# Patient Record
Sex: FEMALE | Race: ASIAN | NOT HISPANIC OR LATINO | Employment: OTHER | URBAN - METROPOLITAN AREA
[De-identification: names, ages, dates, MRNs, and addresses within clinical notes are randomized per-mention and may not be internally consistent; named-entity substitution may affect disease eponyms.]

---

## 2021-06-04 ENCOUNTER — TRANSFERRED RECORDS (OUTPATIENT)
Dept: MULTI SPECIALTY CLINIC | Facility: CLINIC | Age: 61
End: 2021-06-04

## 2021-06-04 LAB — PAP SMEAR - HIM PATIENT REPORTED: NEGATIVE

## 2021-09-01 ENCOUNTER — OFFICE VISIT (OUTPATIENT)
Dept: ENDOCRINOLOGY | Facility: CLINIC | Age: 61
End: 2021-09-01
Payer: COMMERCIAL

## 2021-09-01 ENCOUNTER — LAB (OUTPATIENT)
Dept: LAB | Facility: CLINIC | Age: 61
End: 2021-09-01

## 2021-09-01 VITALS
DIASTOLIC BLOOD PRESSURE: 89 MMHG | OXYGEN SATURATION: 98 % | WEIGHT: 117 LBS | HEART RATE: 84 BPM | HEIGHT: 60 IN | SYSTOLIC BLOOD PRESSURE: 135 MMHG | BODY MASS INDEX: 22.97 KG/M2

## 2021-09-01 DIAGNOSIS — E11.9 TYPE 2 DIABETES MELLITUS WITHOUT COMPLICATION, WITHOUT LONG-TERM CURRENT USE OF INSULIN (H): ICD-10-CM

## 2021-09-01 DIAGNOSIS — E11.9 TYPE 2 DIABETES MELLITUS WITHOUT COMPLICATION, WITHOUT LONG-TERM CURRENT USE OF INSULIN (H): Primary | ICD-10-CM

## 2021-09-01 LAB — HBA1C MFR BLD: 7.3 % (ref 0–5.6)

## 2021-09-01 PROCEDURE — 80048 BASIC METABOLIC PNL TOTAL CA: CPT

## 2021-09-01 PROCEDURE — 83036 HEMOGLOBIN GLYCOSYLATED A1C: CPT

## 2021-09-01 PROCEDURE — 99204 OFFICE O/P NEW MOD 45 MIN: CPT | Performed by: INTERNAL MEDICINE

## 2021-09-01 PROCEDURE — 82043 UR ALBUMIN QUANTITATIVE: CPT

## 2021-09-01 PROCEDURE — 36415 COLL VENOUS BLD VENIPUNCTURE: CPT

## 2021-09-01 PROCEDURE — 84460 ALANINE AMINO (ALT) (SGPT): CPT

## 2021-09-01 ASSESSMENT — MIFFLIN-ST. JEOR: SCORE: 1007.21

## 2021-09-01 NOTE — PROGRESS NOTES
"Name: Darcy Story is a 61 year old woman, referred by Dr. Zavala (woman) for evaluation of    Chief Complaint   Patient presents with     Diabetes       HPI:  Recent issues:  Here for evaluation of diabetes.  She recalls recommendation to see an endocrinologist by her allergist  Reviewed medical history from patient and Epic chart record        2017. Diagnosis of diabetes mellitus  Medical evaluations with Cedric TOM/Doc  Initial treatment with metformin mediation    Previous [facility] labs include:      Current DM medications:  Metformin 500 mg 1-tab morning and evening  .  Blood glucose (BG) meter One Touch   Tests BID   Recent -248 mg/dl   No meter data available today    FamHx DM: mother  Recent [facility] labs include:  Lab Results   Component Value Date    A1C 7.3 (H) 09/01/2021     12/27/2012    POTASSIUM 3.7 12/27/2012    CHLORIDE 102 12/27/2012    CO2 26 12/27/2012    ANIONGAP 12 12/27/2012    GLC 99 12/27/2012    BUN 13 12/27/2012    CR 0.62 12/27/2012    GFRESTIMATED >90 12/27/2012    GFRESTBLACK >90 12/27/2012    ARIEL 9.3 12/27/2012     Last eye exam 2019, but recent concerns about \"floaters\"  DM Complications: none      Lives in [place], [facility]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  Also sees Dr. Mague Zavala/Allergist, Dr. Misael Murphy/rheumatology     PMH/PSH:  Past Medical History:   Diagnosis Date     Achilles tendon pain     previous right achilles surgery     Cervical pain (neck)     epidural     HTN (hypertension)      Osteoarthritis      Osteopenia      Type 2 diabetes mellitus without complication (H)      Past Surgical History:   Procedure Laterality Date     EXCISE CYST BAKER'S       LAPAROTOMY, TUBAL LIGATION, COMBINED       REMOVE IMPLANT BREAST         Family Hx:  No family history on file.      Social Hx:  Social History     Socioeconomic History     Marital status:      Spouse name: " Not on file     Number of children: Not on file     Years of education: Not on file     Highest education level: Not on file   Occupational History     Not on file   Tobacco Use     Smoking status: Never Smoker     Smokeless tobacco: Never Used   Substance and Sexual Activity     Alcohol use: Never     Drug use: Never     Sexual activity: Not on file   Other Topics Concern     Not on file   Social History Narrative     Not on file     Social Determinants of Health     Financial Resource Strain:      Difficulty of Paying Living Expenses:    Food Insecurity:      Worried About Running Out of Food in the Last Year:      Ran Out of Food in the Last Year:    Transportation Needs:      Lack of Transportation (Medical):      Lack of Transportation (Non-Medical):    Physical Activity:      Days of Exercise per Week:      Minutes of Exercise per Session:    Stress:      Feeling of Stress :    Social Connections:      Frequency of Communication with Friends and Family:      Frequency of Social Gatherings with Friends and Family:      Attends Sabianist Services:      Active Member of Clubs or Organizations:      Attends Club or Organization Meetings:      Marital Status:    Intimate Partner Violence:      Fear of Current or Ex-Partner:      Emotionally Abused:      Physically Abused:      Sexually Abused:           MEDICATIONS:  has a current medication list which includes the following prescription(s): cetirizine and metformin.    ROS:     ROS: 10 point ROS neg other than the symptoms noted above in the HPI.    GENERAL: mild fatigue, wt stable; denies fevers, chills, night sweats.   HEENT: no dysphagia, odonophagia, diplopia, neck pain  THYROID:  no apparent hyper or hypothyroid symptoms  CV: no chest pain, pressure, palpitations  LUNGS: no SOB, MILLER, cough, wheezing   ABDOMEN: no diarrhea, constipation, abdominal pain  EXTREMITIES: no rashes, ulcers, edema  NEUROLOGY: no headaches, denies changes in vision, tingling,  extremitiy numbness   MSK: knees and shoulder pains; no muscle aches or pains, weakness  SKIN: no rashes or lesions  :  No menses since age 53  PSYCH:  stable mood, no significant anxiety or depression  ENDOCRINE: no heat or cold intolerance    Physical Exam   VS: /89   Pulse 84   Ht 1.524 m (5')   Wt 53.1 kg (117 lb)   SpO2 98%   BMI 22.85 kg/m    GENERAL: AXOX3, NAD, short stature, well dressed, answering questions appropriately, appears stated age.  THYROID:  normal gland, no apparent nodules or goiter  HEENT: neck non-tender, no exopthalmous, no proptosis, EOMI  CV: RRR, no rubs, gallops, no murmurs  LUNGS: CTAB, no wheezes, rales, or ronchi  ABDOMEN: soft, nontender, nondistended  EXTREMITIES: no edema, +pedal pulses, no lesions  NEUROLOGY: CN grossly intact, no tremors  MSK: grossly intact  SKIN: right great toenail thickening; no rashes, no lesions    LABS:    All pertinent notes, labs, and images personally reviewed by me.     A/P:  Encounter Diagnosis   Name Primary?     Type 2 diabetes mellitus without complication, without long-term current use of insulin (H) Yes       Comments:  Reviewed health history and diabetes issues.  Current T2DM management plan with PCP reasonable, though I advise repeat lab testing    Plan:  Discussed general issues with the diabetes diagnosis and management  We discussed the hgbA1c test which reflects previous overall glucose levels or control  Discussed the importance of blood glucose (BG) testing to assess glucose trends  Provided general overview of the diabetes medication options and medication treatment plan.    Recommend:  Continue current metformin med use  Will discuss metformin dose options, other med options such as DPP4-I, GLP1RA, SGLT2-I meds  Goal target FBG  mg/dl  Check FBG or premeal BID glucose levels daily  Need to clarify her issues with fingertip BG testing, meter and lancet device use  Consider use of Felecia CGM sensor, if covered by  insurance   Felecia pamphlet given  Keep focus on diet, exercise, weight management.  Due for f/u eye exam soon  Advise having fasting lipid panel testing and dilated eye examination, at least annually  Future T2DM management with PCP reasonable    Addressed patient questions today      There are no Patient Instructions on file for this visit.    Future labs ordered today:   Orders Placed This Encounter   Procedures     Basic metabolic panel     Hemoglobin A1c     Albumin Random Urine Quantitative with Creat Ratio     ALT     Radiology/Consults ordered today: None    Total time spent in with the patient evaluation:  25 min  Additional time spent reviewing pertinent lab tests and chart notes, and documentation:  10 min    Follow-up:  9/15/21 at 12p, Return    THERESA España MD, MS  Endocrinology  [facility]    CC:  QUYNH Zavala, and JAIMIE Jones

## 2021-09-01 NOTE — LETTER
"    9/1/2021         RE: Darcy Story  [address] [facility] [address]        Dear Colleague,    Thank you for referring your patient, Darcy Story, to the [facility] [place]. Please see a copy of my visit note below.    Name: Darcy Story is a 61 year old woman, referred by Dr. Zavala (woman) for evaluation of    Chief Complaint   Patient presents with     Diabetes       HPI:  Recent issues:  Here for evaluation of diabetes.  She recalls recommendation to see an endocrinologist by her allergist  Reviewed medical history from patient and Epic chart record        2017. Diagnosis of diabetes mellitus  Medical evaluations with Cedric TOM/Doc  Initial treatment with metformin mediation    Previous [facility] labs include:      Current DM medications:  Metformin 500 mg 1-tab morning and evening  .  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  Also sees Dr. Mague Zavala/Allergist, Dr. Misael Murphy/rheumatology     PMH/PSH:  Past Medical History:   Diagnosis Date     Achilles tendon pain     previous right achilles surgery     Cervical pain (neck)     epidural     HTN (hypertension)      Osteoarthritis      Osteopenia      Type 2 diabetes mellitus without complication (H)      Past Surgical History: "   Procedure Laterality Date     EXCISE CYST BAKER'S       LAPAROTOMY, TUBAL LIGATION, COMBINED       REMOVE IMPLANT BREAST         Family Hx:  No family history on file.      Social Hx:  Social History     Socioeconomic History     Marital status:      Spouse name: Not on file     Number of children: Not on file     Years of education: Not on file     Highest education level: Not on file   Occupational History     Not on file   Tobacco Use     Smoking status: Never Smoker     Smokeless tobacco: Never Used   Substance and Sexual Activity     Alcohol use: Never     Drug use: Never     Sexual activity: Not on file   Other Topics Concern     Not on file   Social History Narrative     Not on file     Social Determinants of Health     Financial Resource Strain:      Difficulty of Paying Living Expenses:    Food Insecurity:      Worried About Running Out of Food in the Last Year:      Ran Out of Food in the Last Year:    Transportation Needs:      Lack of Transportation (Medical):      Lack of Transportation (Non-Medical):    Physical Activity:      Days of Exercise per Week:      Minutes of Exercise per Session:    Stress:      Feeling of Stress :    Social Connections:      Frequency of Communication with Friends and Family:      Frequency of Social Gatherings with Friends and Family:      Attends Adventism Services:      Active Member of Clubs or Organizations:      Attends Club or Organization Meetings:      Marital Status:    Intimate Partner Violence:      Fear of Current or Ex-Partner:      Emotionally Abused:      Physically Abused:      Sexually Abused:           MEDICATIONS:  has a current medication list which includes the following prescription(s): cetirizine and metformin.    ROS:     ROS: 10 point ROS neg other than the symptoms noted above in the HPI.    GENERAL: mild fatigue, wt stable; denies fevers, chills, night sweats.   HEENT: no dysphagia, odonophagia, diplopia, neck pain  THYROID:  no apparent  hyper or hypothyroid symptoms  CV: no chest pain, pressure, palpitations  LUNGS: no SOB, MILLER, cough, wheezing   ABDOMEN: no diarrhea, constipation, abdominal pain  EXTREMITIES: no rashes, ulcers, edema  NEUROLOGY: no headaches, denies changes in vision, tingling, extremitiy numbness   MSK: knees and shoulder pains; no muscle aches or pains, weakness  SKIN: no rashes or lesions  :  No menses since age 53  PSYCH:  stable mood, no significant anxiety or depression  ENDOCRINE: no heat or cold intolerance    Physical Exam   VS: /89   Pulse 84   Ht 1.524 m (5')   Wt 53.1 kg (117 lb)   SpO2 98%   BMI 22.85 kg/m    GENERAL: AXOX3, NAD, short stature, well dressed, answering questions appropriately, appears stated age.  THYROID:  normal gland, no apparent nodules or goiter  HEENT: neck non-tender, no exopthalmous, no proptosis, EOMI  CV: RRR, no rubs, gallops, no murmurs  LUNGS: CTAB, no wheezes, rales, or ronchi  ABDOMEN: soft, nontender, nondistended  EXTREMITIES: no edema, +pedal pulses, no lesions  NEUROLOGY: CN grossly intact, no tremors  MSK: grossly intact  SKIN: right great toenail thickening; no rashes, no lesions    LABS:    All pertinent notes, labs, and images personally reviewed by me.     A/P:  Encounter Diagnosis   Name Primary?     Type 2 diabetes mellitus without complication, without long-term current use of insulin (H) Yes       Comments:  Reviewed health history and diabetes issues.  Current T2DM management plan with PCP reasonable, though I advise repeat lab testing    Plan:  Discussed general issues with the diabetes diagnosis and management  We discussed the hgbA1c test which reflects previous overall glucose levels or control  Discussed the importance of blood glucose (BG) testing to assess glucose trends  Provided general overview of the diabetes medication options and medication treatment plan.    Recommend:  Continue current metformin med use  Will discuss metformin dose options, other  med options such as DPP4-I, GLP1RA, SGLT2-I meds  Goal target FBG  mg/dl  Check FBG or premeal BID glucose levels daily  Need to clarify her issues with fingertip BG testing, meter and lancet device use  Consider use of Felecia CGM sensor, if covered by insurance   Felecia pamphlet given  Keep focus on diet, exercise, weight management.  Due for f/u eye exam soon  Advise having fasting lipid panel testing and dilated eye examination, at least annually  Future T2DM management with PCP reasonable    Addressed patient questions today      There are no Patient Instructions on file for this visit.    Future labs ordered today:   Orders Placed This Encounter   Procedures     Basic metabolic panel     Hemoglobin A1c     Albumin Random Urine Quantitative with Creat Ratio     ALT     Radiology/Consults ordered today: None    Total time spent in with the patient evaluation:  25 min  Additional time spent reviewing pertinent lab tests and chart notes, and documentation:  10 min    Follow-up:  9/15/21 at 12p, Return    THERESA España MD, MS  Endocrinology  [facility]    CC:  QUYNH Zavala, and JAIMIE Jones           Again, thank you for allowing me to participate in the care of your patient.        Sincerely,        Constantino España MD

## 2021-09-02 LAB
ALT SERPL W P-5'-P-CCNC: 45 U/L (ref 0–50)
ANION GAP SERPL CALCULATED.3IONS-SCNC: 3 MMOL/L (ref 3–14)
BUN SERPL-MCNC: 13 MG/DL (ref 7–30)
CALCIUM SERPL-MCNC: 9.2 MG/DL (ref 8.5–10.1)
CHLORIDE BLD-SCNC: 106 MMOL/L (ref 94–109)
CO2 SERPL-SCNC: 28 MMOL/L (ref 20–32)
CREAT SERPL-MCNC: 0.52 MG/DL (ref 0.52–1.04)
CREAT UR-MCNC: 97 MG/DL
GFR SERPL CREATININE-BSD FRML MDRD: >90 ML/MIN/1.73M2
GLUCOSE BLD-MCNC: 148 MG/DL (ref 70–99)
MICROALBUMIN UR-MCNC: 13 MG/L
MICROALBUMIN/CREAT UR: 13.4 MG/G CR (ref 0–25)
POTASSIUM BLD-SCNC: 4.6 MMOL/L (ref 3.4–5.3)
SODIUM SERPL-SCNC: 137 MMOL/L (ref 133–144)

## 2021-09-15 ENCOUNTER — OFFICE VISIT (OUTPATIENT)
Dept: ENDOCRINOLOGY | Facility: CLINIC | Age: 61
End: 2021-09-15
Payer: COMMERCIAL

## 2021-09-15 VITALS
SYSTOLIC BLOOD PRESSURE: 136 MMHG | DIASTOLIC BLOOD PRESSURE: 88 MMHG | HEART RATE: 85 BPM | BODY MASS INDEX: 22.97 KG/M2 | WEIGHT: 117.6 LBS

## 2021-09-15 DIAGNOSIS — E11.9 TYPE 2 DIABETES MELLITUS WITHOUT COMPLICATION, WITHOUT LONG-TERM CURRENT USE OF INSULIN (H): Primary | ICD-10-CM

## 2021-09-15 PROCEDURE — 99214 OFFICE O/P EST MOD 30 MIN: CPT | Performed by: INTERNAL MEDICINE

## 2021-09-15 RX ORDER — SITAGLIPTIN AND METFORMIN HYDROCHLORIDE 1000; 100 MG/1; MG/1
1 TABLET, FILM COATED, EXTENDED RELEASE ORAL DAILY
Qty: 30 TABLET | Refills: 11 | Status: SHIPPED | OUTPATIENT
Start: 2021-09-15 | End: 2022-06-08 | Stop reason: DRUGHIGH

## 2021-09-15 NOTE — LETTER
"    9/15/2021         RE: Darcy Story  [address] [facility] [address]        Dear Colleague,    Thank you for referring your patient, Darcy Story, to the [facility] [place]. Please see a copy of my visit note below.      Recent issues:   Diabetes follow-up evaluation.  Reviewed medical history from patient, recent preappt labs, and Epic chart record        2017. Diagnosis of diabetes mellitus  Medical evaluations with Cedric TOM/Doc  Initial treatment with metformin mediation    Previous Doc labs include:        9/1/21. Initial diabetes evaluation with me at [place]  Reviewed health history and diabetes issues    Current DM medications:  Metformin 500 mg 1-tab morning and evening  .  Blood glucose (BG) meter One Touch   Tests BID   Previous -248 mg/dl   No meter data available today    FamHx DM: mother  Recent [facility] labs include:  Lab Results   Component Value Date    A1C 7.3 (H) 09/01/2021     09/01/2021    POTASSIUM 4.6 09/01/2021    CHLORIDE 106 09/01/2021    CO2 28 09/01/2021    ANIONGAP 3 09/01/2021     (H) 09/01/2021    BUN 13 09/01/2021    CR 0.52 09/01/2021    GFRESTIMATED >90 09/01/2021    GFRESTBLACK >90 12/27/2012    ARIEL 9.2 09/01/2021    UCRR 97 09/01/2021    MICROL 13 09/01/2021    UMALCR 13.40 09/01/2021     Last eye exam 2019, but recent concerns about \"floaters\"  DM Complications: none      Lives in [place], [facility]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  Also sees Dr. Mague Zavala/Allergist, Dr. Misael Murphy/rheumatology     PMH/PSH:  Past Medical History:   Diagnosis Date     Achilles tendon pain     previous right achilles surgery     Cervical pain (neck)     epidural     Chronic back pain      HTN (hypertension)      Near syncope      Osteoarthritis      Osteopenia      Type 2 diabetes mellitus without complication (H)      Past Surgical History:   Procedure " Laterality Date     EXCISE CYST BAKER'S       LAPAROTOMY, TUBAL LIGATION, COMBINED       REMOVE IMPLANT BREAST         Family Hx:  No family history on file.      Social Hx:  Social History     Socioeconomic History     Marital status:      Spouse name: Not on file     Number of children: Not on file     Years of education: Not on file     Highest education level: Not on file   Occupational History     Not on file   Tobacco Use     Smoking status: Never Smoker     Smokeless tobacco: Never Used   Substance and Sexual Activity     Alcohol use: Never     Drug use: Never     Sexual activity: Not on file   Other Topics Concern     Not on file   Social History Narrative     Not on file     Social Determinants of Health     Financial Resource Strain:      Difficulty of Paying Living Expenses:    Food Insecurity:      Worried About Running Out of Food in the Last Year:      Ran Out of Food in the Last Year:    Transportation Needs:      Lack of Transportation (Medical):      Lack of Transportation (Non-Medical):    Physical Activity:      Days of Exercise per Week:      Minutes of Exercise per Session:    Stress:      Feeling of Stress :    Social Connections:      Frequency of Communication with Friends and Family:      Frequency of Social Gatherings with Friends and Family:      Attends Catholic Services:      Active Member of Clubs or Organizations:      Attends Club or Organization Meetings:      Marital Status:    Intimate Partner Violence:      Fear of Current or Ex-Partner:      Emotionally Abused:      Physically Abused:      Sexually Abused:           MEDICATIONS:  has a current medication list which includes the following prescription(s): cetirizine and janumet xr.    ROS:     ROS: 10 point ROS neg other than the symptoms noted above in the HPI.    GENERAL: mild fatigue, wt stable; denies fevers, chills, night sweats.   HEENT: no dysphagia, odonophagia, diplopia, neck pain  THYROID:  no apparent hyper or  hypothyroid symptoms  CV: no chest pain, pressure, palpitations  LUNGS: no SOB, MILLER, cough, wheezing   ABDOMEN: no diarrhea, constipation, abdominal pain  EXTREMITIES: no rashes, ulcers, edema  NEUROLOGY: no headaches, denies changes in vision, tingling, extremitiy numbness   MSK: knees and shoulder pains; no muscle aches or pains, weakness  SKIN: no rashes or lesions  :  No menses since age 53  PSYCH:  stable mood, no significant anxiety or depression  ENDOCRINE: no heat or cold intolerance    Physical Exam   VS: /88   Pulse 85   Wt 53.3 kg (117 lb 9.6 oz)   BMI 22.97 kg/m    GENERAL: AXOX3, NAD, short stature, well dressed, answering questions appropriately, appears stated age.  THYROID:  normal gland, no apparent nodules or goiter  HEENT: neck non-tender, no exopthalmous, no proptosis, EOMI  CV: RRR, no rubs, gallops, no murmurs  LUNGS: CTAB, no wheezes, rales, or ronchi  ABDOMEN: soft, nontender, nondistended  EXTREMITIES: no pedal edema  NEUROLOGY: CN grossly intact, no tremors  MSK: grossly intact  SKIN: right great toenail thickening; no rashes, no lesions    LABS:    All pertinent notes, labs, and images personally reviewed by me.     A/P:  Encounter Diagnosis   Name Primary?     Type 2 diabetes mellitus without complication, without long-term current use of insulin (H) Yes       Comments:  Reviewed health history and diabetes issues.  Her previous FBG variable, suspect postmeal hyperglycemia  Reviewed and interpreted tests that I previously ordered.   Ordered appropriate tests for the endocrinology disease management.    Management options discussed and implemented after shared medical decision making with the patient.  T2DM problem is chronic with exacerbation progression, hyperglycemia    Plan:  Discussed general issues with the diabetes diagnosis and management  We discussed the hgbA1c test which reflects previous overall glucose levels or control  Discussed the importance of blood glucose  (BG) testing to assess glucose trends  Provided general overview of the diabetes medication options and medication treatment plan.    Recommend:  Change to more aggressive T2DM med plan, adding a DPP4-I medication, discussed Janumet vs Jentadueto options  Plan med change to JanumetXR 100/1000 as 1-tab daily   Reviewed this medication and dosing   New Rx sent to pharmacy  Goal target FBG  mg/dl  Check FBG or premeal BID glucose levels daily  Need to clarify her issues with fingertip BG testing, meter and lancet device use  Consider use of Felecia CGM sensor, if covered by insurance  Advised seeing one of our [facility] CDE's, wear diagnostic Felecia rather than purchase of ([id number]) CGM sensor   Diab Ed Referral placed  Keep focus on diet, exercise, weight management.  Due for f/u eye exam soon  Advise having fasting lipid panel testing and dilated eye examination, at least annually    Addressed patient questions today    There are no Patient Instructions on file for this visit.    Future labs ordered today:   Orders Placed This Encounter   Procedures     AMB Adult Diabetes Educator Referral     Radiology/Consults ordered today: AMBULATORY ADULT DIABETES EDUCATOR REFERRAL    Total time spent in with the patient evaluation:  15 min  Additional time spent reviewing pertinent lab tests and chart notes, and documentation:  5 min    Follow-up:  2/2022    THERESA España MD, MS  Endocrinology  [facility]    CC:  QUYNH Zavala, and JAIMIE Jones           Again, thank you for allowing me to participate in the care of your patient.        Sincerely,        Constantino España MD

## 2021-09-15 NOTE — PROGRESS NOTES
"  Recent issues:   Diabetes follow-up evaluation.  Reviewed medical history from patient, recent preappt labs, and Epic chart record        2017. Diagnosis of diabetes mellitus  Medical evaluations with Cedric TOM/Doc  Initial treatment with metformin mediation    Previous Doc labs include:        9/1/21. Initial diabetes evaluation with me at [place]  Reviewed health history and diabetes issues    Current DM medications:  Metformin 500 mg 1-tab morning and evening  .  Blood glucose (BG) meter One Touch   Tests BID   Previous -248 mg/dl   No meter data available today    FamHx DM: mother  Recent [facility] labs include:  Lab Results   Component Value Date    A1C 7.3 (H) 09/01/2021     09/01/2021    POTASSIUM 4.6 09/01/2021    CHLORIDE 106 09/01/2021    CO2 28 09/01/2021    ANIONGAP 3 09/01/2021     (H) 09/01/2021    BUN 13 09/01/2021    CR 0.52 09/01/2021    GFRESTIMATED >90 09/01/2021    GFRESTBLACK >90 12/27/2012    ARIEL 9.2 09/01/2021    UCRR 97 09/01/2021    MICROL 13 09/01/2021    UMALCR 13.40 09/01/2021     Last eye exam 2019, but recent concerns about \"floaters\"  DM Complications: none      Lives in [place], [facility]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  Also sees Dr. Mague Zavala/Allergist, Dr. Misael Murphy/rheumatology     PMH/PSH:  Past Medical History:   Diagnosis Date     Achilles tendon pain     previous right achilles surgery     Cervical pain (neck)     epidural     Chronic back pain      HTN (hypertension)      Near syncope      Osteoarthritis      Osteopenia      Type 2 diabetes mellitus without complication (H)      Past Surgical History:   Procedure Laterality Date     EXCISE CYST BAKER'S       LAPAROTOMY, TUBAL LIGATION, COMBINED       REMOVE IMPLANT BREAST         Family Hx:  No family history on file.      Social Hx:  Social History     Socioeconomic History     Marital status:      Spouse name: Not " on file     Number of children: Not on file     Years of education: Not on file     Highest education level: Not on file   Occupational History     Not on file   Tobacco Use     Smoking status: Never Smoker     Smokeless tobacco: Never Used   Substance and Sexual Activity     Alcohol use: Never     Drug use: Never     Sexual activity: Not on file   Other Topics Concern     Not on file   Social History Narrative     Not on file     Social Determinants of Health     Financial Resource Strain:      Difficulty of Paying Living Expenses:    Food Insecurity:      Worried About Running Out of Food in the Last Year:      Ran Out of Food in the Last Year:    Transportation Needs:      Lack of Transportation (Medical):      Lack of Transportation (Non-Medical):    Physical Activity:      Days of Exercise per Week:      Minutes of Exercise per Session:    Stress:      Feeling of Stress :    Social Connections:      Frequency of Communication with Friends and Family:      Frequency of Social Gatherings with Friends and Family:      Attends Denominational Services:      Active Member of Clubs or Organizations:      Attends Club or Organization Meetings:      Marital Status:    Intimate Partner Violence:      Fear of Current or Ex-Partner:      Emotionally Abused:      Physically Abused:      Sexually Abused:           MEDICATIONS:  has a current medication list which includes the following prescription(s): cetirizine and janumet xr.    ROS:     ROS: 10 point ROS neg other than the symptoms noted above in the HPI.    GENERAL: mild fatigue, wt stable; denies fevers, chills, night sweats.   HEENT: no dysphagia, odonophagia, diplopia, neck pain  THYROID:  no apparent hyper or hypothyroid symptoms  CV: no chest pain, pressure, palpitations  LUNGS: no SOB, MILLER, cough, wheezing   ABDOMEN: no diarrhea, constipation, abdominal pain  EXTREMITIES: no rashes, ulcers, edema  NEUROLOGY: no headaches, denies changes in vision, tingling, extremitiy  numbness   MSK: knees and shoulder pains; no muscle aches or pains, weakness  SKIN: no rashes or lesions  :  No menses since age 53  PSYCH:  stable mood, no significant anxiety or depression  ENDOCRINE: no heat or cold intolerance    Physical Exam   VS: /88   Pulse 85   Wt 53.3 kg (117 lb 9.6 oz)   BMI 22.97 kg/m    GENERAL: AXOX3, NAD, short stature, well dressed, answering questions appropriately, appears stated age.  THYROID:  normal gland, no apparent nodules or goiter  HEENT: neck non-tender, no exopthalmous, no proptosis, EOMI  CV: RRR, no rubs, gallops, no murmurs  LUNGS: CTAB, no wheezes, rales, or ronchi  ABDOMEN: soft, nontender, nondistended  EXTREMITIES: no pedal edema  NEUROLOGY: CN grossly intact, no tremors  MSK: grossly intact  SKIN: right great toenail thickening; no rashes, no lesions    LABS:    All pertinent notes, labs, and images personally reviewed by me.     A/P:  Encounter Diagnosis   Name Primary?     Type 2 diabetes mellitus without complication, without long-term current use of insulin (H) Yes       Comments:  Reviewed health history and diabetes issues.  Her previous FBG variable, suspect postmeal hyperglycemia  Reviewed and interpreted tests that I previously ordered.   Ordered appropriate tests for the endocrinology disease management.    Management options discussed and implemented after shared medical decision making with the patient.  T2DM problem is chronic with exacerbation progression, hyperglycemia    Plan:  Discussed general issues with the diabetes diagnosis and management  We discussed the hgbA1c test which reflects previous overall glucose levels or control  Discussed the importance of blood glucose (BG) testing to assess glucose trends  Provided general overview of the diabetes medication options and medication treatment plan.    Recommend:  Change to more aggressive T2DM med plan, adding a DPP4-I medication, discussed Janumet vs Jentadueto options  Plan med  change to JanumetXR 100/1000 as 1-tab daily   Reviewed this medication and dosing   New Rx sent to pharmacy  Goal target FBG  mg/dl  Check FBG or premeal BID glucose levels daily  Need to clarify her issues with fingertip BG testing, meter and lancet device use  Consider use of Felecia CGM sensor, if covered by insurance  Advised seeing one of our [facility] CDE's, wear diagnostic Felecia rather than purchase of (DexcomG6) CGM sensor   Diab Ed Referral placed  Keep focus on diet, exercise, weight management.  Due for f/u eye exam soon  Advise having fasting lipid panel testing and dilated eye examination, at least annually    Addressed patient questions today    There are no Patient Instructions on file for this visit.    Future labs ordered today:   Orders Placed This Encounter   Procedures     AMB Adult Diabetes Educator Referral     Radiology/Consults ordered today: AMBULATORY ADULT DIABETES EDUCATOR REFERRAL    Total time spent in with the patient evaluation:  15 min  Additional time spent reviewing pertinent lab tests and chart notes, and documentation:  5 min    Follow-up:  2/2022    THERESA España MD, MS  Endocrinology  [facility]    CC:  QUYNH Zavala, and JAIMIE Jones

## 2021-09-17 ENCOUNTER — ALLIED HEALTH/NURSE VISIT (OUTPATIENT)
Dept: EDUCATION SERVICES | Facility: CLINIC | Age: 61
End: 2021-09-17
Attending: INTERNAL MEDICINE
Payer: COMMERCIAL

## 2021-09-17 ENCOUNTER — TELEPHONE (OUTPATIENT)
Dept: EDUCATION SERVICES | Facility: CLINIC | Age: 61
End: 2021-09-17

## 2021-09-17 DIAGNOSIS — E11.9 TYPE 2 DIABETES MELLITUS WITHOUT COMPLICATION, WITHOUT LONG-TERM CURRENT USE OF INSULIN (H): ICD-10-CM

## 2021-09-17 PROCEDURE — 95250 CONT GLUC MNTR PHYS/QHP EQP: CPT | Performed by: DIETITIAN, REGISTERED

## 2021-09-17 RX ORDER — PROCHLORPERAZINE 25 MG/1
SUPPOSITORY RECTAL
Qty: 1 EACH | Refills: 3 | Status: CANCELLED | OUTPATIENT
Start: 2021-09-17

## 2021-09-17 RX ORDER — PROCHLORPERAZINE 25 MG/1
SUPPOSITORY RECTAL
Qty: 3 EACH | Refills: 11 | Status: CANCELLED | OUTPATIENT
Start: 2021-09-17

## 2021-09-17 NOTE — LETTER
9/17/2021         RE: Darcy Story  [address] [facility] [address]        Dear Colleague,    Thank you for referring your patient, Darcy Story, to the [facility] [place]. Please see a copy of my visit note below.    Diabetes Self-Management Education & Support  Professional Continuous Glucose Monitor Insertion    SUBJECTIVE/OBJECTIVE:     Darcy Story presents for professional Continuous Glucose Monitor Insertion.     Patient comments/concerns: My insurance will not cover a Felecia but they will cover a Dexcom or diagnostic Felecia study. I'm having issues with poking my finger - meter requiring multiple pokes/strips to get a test. Very frustrated with variability in numbers. Works hard to follow a healthy lifestyle - doesn't eat bread, exercises every morning, doesn't drink alcohol or pop. Has not tolerated metformin well in the past - frequent diarrhea. Has not started Janumet yet but plans to  today, start tomorrow.     Lab Results:  Lab Results   Component Value Date    A1C 7.3 09/01/2021      Lab Results   Component Value Date     09/01/2021    GLC 99 12/27/2012       Medication:  Diabetes Medication(s)     Antidiabetic Combinations       SitaGLIPtin-MetFORMIN HCl (JANUMET XR) 100-1000 MG TB24    Take 1 tablet by mouth daily          ASSESSMENT:    CGM study indicated for: Difficult to manage hypoglycemia and/or hyperglycemia, Unexplained fluctuations in glucose values     INTERVENTION:   Sensor started today.     Sensor Type: LibrePro  Lot #: [id number]  Serial #: [id number]  Expiration Date: 10/31/21  Diabetes management related comments/concerns: I hate poking my finger and I have a very hard time getting blood - have had to poke my finger & use 12 strips to get a test before.    Sensor was inserted with no resistance or bleeding at insertion site.      Pt will plan to wear the sensor through 9/30/21.    WRITTEN AND VERBAL INFORMATION GIVEN TO  SUPPORT UNDERSTANDING OF:  LibrePro CGM: Sensor insertion, intention of monitoring for 14 days. Keep records of BG, food intake, exercise, and medication dosing during wear.       Patient verbalizes understanding of how to remove sensor, if needed, and all instructions provided.     Educational and other materials:  Food/exercise/medication log sheets  Contact information    PLAN:  Pt was given instructions for tracking BG, medications, food intake and activity.  Patient to return all items associated with the professional Continuous Glucose Monitor System.  See Patient Instructions, AVS printed and provided to patient today.    Recommend use of Dexcom G6 CGMS to allow for more glucose monitoring without finger pokes. She states her insurance will cover this. Will reach out to referring provider in separate encounter for sign off on orders & if patient has items by next visit, can bring in to get help getting started.     Follow-up:    Follow up on 9/30/21.    Andreia Seay RD, LD, [facility]   Time spent in DSMT: 20 minutes   Time spent in CGM insertion: 5 minutes, in addition to time spent in DSMT  Encounter Type: Individual

## 2021-09-17 NOTE — PATIENT INSTRUCTIONS
1. Plan to wear the LibrePro sensor for 14 days. It is okay to shower, bathe, and swim (up to 3 feet deep for 30 minutes)    2. Continue with your usual diabetes care plan - check blood sugars and take medicines, as prescribed.    3. Keep a log of what you eat and drink, when you take your medications and how much you take, and exercise you do while you are wearing the sensor.    4. Do not cover the sensor with extra adhesive (the small hole in the center of the sensor must remain uncovered)    5. Use a little extra care, especially when getting dressed or exercising, to avoid accidentally loosening or removing the sensor.     6. Remove the sensor if you need to have an MRI or CT scan.     If the LibrePro sensor comes off early, place it in a plastic bag or envelope and call your diabetes educator or bring it with you to your follow-up visit.     Return the sensor to the [facility] on 9/30.    [facility] for the [facility]:  For Your Diabetes Education and Nutrition Appointments Call:  [phone]   For Diabetes Education or Nutrition Related Questions:   Phone: [phone]  Send [employer]Munch Message   If you need a medication refill please contact your pharmacy. Please allow 3 business days for your refills to be completed.

## 2021-09-17 NOTE — PROGRESS NOTES
Diabetes Self-Management Education & Support  Professional Continuous Glucose Monitor Insertion    SUBJECTIVE/OBJECTIVE:     Darcy Story presents for professional Continuous Glucose Monitor Insertion.     Patient comments/concerns: My insurance will not cover a Felecia but they will cover a Dexcom or diagnostic Felecia study. I'm having issues with poking my finger - meter requiring multiple pokes/strips to get a test. Very frustrated with variability in numbers. Works hard to follow a healthy lifestyle - doesn't eat bread, exercises every morning, doesn't drink alcohol or pop. Has not tolerated metformin well in the past - frequent diarrhea. Has not started Janumet yet but plans to  today, start tomorrow.     Lab Results:  Lab Results   Component Value Date    A1C 7.3 09/01/2021      Lab Results   Component Value Date     09/01/2021    GLC 99 12/27/2012       Medication:  Diabetes Medication(s)     Antidiabetic Combinations       SitaGLIPtin-MetFORMIN HCl (JANUMET XR) 100-1000 MG TB24    Take 1 tablet by mouth daily          ASSESSMENT:    CGM study indicated for: Difficult to manage hypoglycemia and/or hyperglycemia, Unexplained fluctuations in glucose values     INTERVENTION:   Sensor started today.     Sensor Type: LibrePro  Lot #: [id number]  Serial #: [id number]  Expiration Date: 10/31/21  Diabetes management related comments/concerns: I hate poking my finger and I have a very hard time getting blood - have had to poke my finger & use 12 strips to get a test before.    Sensor was inserted with no resistance or bleeding at insertion site.      Pt will plan to wear the sensor through 9/30/21.    WRITTEN AND VERBAL INFORMATION GIVEN TO SUPPORT UNDERSTANDING OF:  LibrePro CGM: Sensor insertion, intention of monitoring for 14 days. Keep records of BG, food intake, exercise, and medication dosing during wear.       Patient verbalizes understanding of how to remove sensor, if needed, and all  instructions provided.     Educational and other materials:  Food/exercise/medication log sheets  Contact information    PLAN:  Pt was given instructions for tracking BG, medications, food intake and activity.  Patient to return all items associated with the professional Continuous Glucose Monitor System.  See Patient Instructions, AVS printed and provided to patient today.    Recommend use of Dexcom G6 CGMS to allow for more glucose monitoring without finger pokes. She states her insurance will cover this. Will reach out to referring provider in separate encounter for sign off on orders & if patient has items by next visit, can bring in to get help getting started.     Follow-up:    Follow up on 9/30/21.    Andreia Seay RD, LD, [facility]   Time spent in DSMT: 20 minutes   Time spent in CGM insertion: 5 minutes, in addition to time spent in DSMT  Encounter Type: Individual

## 2021-09-17 NOTE — TELEPHONE ENCOUNTER
Dr. España,   Patient is interested in getting away from finger pokes and was informed that Dexcom G6 is well covered by her insurance. I've pended orders - please sign off if you agree and I can help her get started at her follow up visit, once she has supplies.   Thank you!  Andreia Seay, RD, LD, [facility]

## 2021-09-24 ENCOUNTER — TELEPHONE (OUTPATIENT)
Dept: EDUCATION SERVICES | Facility: CLINIC | Age: 61
End: 2021-09-24

## 2021-09-24 NOTE — TELEPHONE ENCOUNTER
I returned call from pt. She wanted to know why the dexcom G6 Rx wasn't at her pharmacy. I reviewed her chart and told her that DR. España didn't sign off on it and that is why the pharmacy doesn't have it. I said it looks like he wants to see the results of her Pro Felecia before moving forward. I reminded her of her appt with Andreia next week and said Andreia will follow-up with her then.    Tameka Ames RN, [facility]

## 2021-09-30 ENCOUNTER — ALLIED HEALTH/NURSE VISIT (OUTPATIENT)
Dept: EDUCATION SERVICES | Facility: CLINIC | Age: 61
End: 2021-09-30
Payer: COMMERCIAL

## 2021-09-30 DIAGNOSIS — E11.9 TYPE 2 DIABETES MELLITUS WITHOUT COMPLICATION, WITHOUT LONG-TERM CURRENT USE OF INSULIN (H): Primary | ICD-10-CM

## 2021-09-30 PROCEDURE — G0108 DIAB MANAGE TRN  PER INDIV: HCPCS

## 2021-09-30 NOTE — Clinical Note
Dr. España,   Please see CDE progress note for continuous glucose monitoring (CGM) reports, assessment and recommendations.     As a provider, you can bill for a non face-to-face interpretation of the sensor report. If you would like to bill for this service, please create a 'Documentation Only' encounter noting your interpretation and assessment of CGM reports, your recommended plan, and bill for this CGM interpretation using code [id number].     I will check in with you to get your thoughts on her blood sugar trends when I have a chance.   Thanks,   Andreia Seay, RD, LD, [facility]

## 2021-09-30 NOTE — PROGRESS NOTES
Diabetes Self-Management Education & Support    Presents for: CGM Review    SUBJECTIVE/OBJECTIVE:  Presents for: CGM Review  Accompanied by: Self  Diabetes education in the past 24mo: No  Focus of Visit: CGM  Type of CGM visit: Professional CGM  Diabetes type: Type 2  Disease course: Getting harder to manage  How confident are you filling out medical forms by yourself:: Not Assessed  Diabetes management related comments/concerns: Didn't even notice wearing the Felecia  Transportation concerns: No  Difficulty affording diabetes medication?: No  Difficulty affording diabetes testing supplies?: No  Other concerns:: English as a second language  Cultural Influences/Ethnic Background:      Diabetes Symptoms & Complications:     Complications assessed today?: No    Patient Problem List and Family Medical History reviewed for relevant medical history, current medical status, and diabetes risk factors.    Vitals:  There were no vitals taken for this visit.  Estimated body mass index is 22.97 kg/m  as calculated from the following:    Height as of 9/1/21: 1.524 m (5').    Weight as of 9/15/21: 53.3 kg (117 lb 9.6 oz).   Last 3 BP:   BP Readings from Last 3 Encounters:   09/15/21 136/88   09/01/21 135/89   12/27/12 140/82       History   Smoking Status     Never Smoker   Smokeless Tobacco     Never Used       Labs:  Lab Results   Component Value Date    A1C 7.3 09/01/2021     Lab Results   Component Value Date     09/01/2021    GLC 99 12/27/2012     No results found for: LDL  No results found for: HDL]  GFR Estimate   Date Value Ref Range Status   09/01/2021 >90 >60 mL/min/1.73m2 Final     Comment:     As of July 11, 2021, eGFR is calculated by the CKD-EPI creatinine equation, without race adjustment. eGFR can be influenced by muscle mass, exercise, and diet. The reported eGFR is an estimation only and is only applicable if the renal function is stable.   12/27/2012 >90 >60 mL/min/1.7m2 Final     GFR Estimate If  Black   Date Value Ref Range Status   12/27/2012 >90 >60 mL/min/1.7m2 Final     Lab Results   Component Value Date    CR 0.52 09/01/2021    CR 0.62 12/27/2012     No results found for: MICROALBUMIN    Healthy Eating:  Healthy Eating Assessed Today: Yes  Meal planning/habits: Low carb, Smaller portions, Avoiding sweets  How many times a week on average do you eat food made away from home (restaurant/take-out)?: 3  Meals include: Breakfast, Lunch, Dinner  Beverages: Tea, Coffee    Being Active:  Being Active Assessed Today: Yes  Exercise:: Yes  Days per week of moderate to strenuous exercise (like a brisk walk): 7  On average, minutes per day of exercise at this level: 30  How intense was your typical exercise? : Moderate (like brisk walking)  Exercise Minutes per Week: 210  Barrier to exercise: None    Monitoring:  Monitoring Assessed Today: Yes  Did patient bring glucose meter to appointment? : Yes  Times checking blood sugar at home (number): 2  Times checking blood sugar at home (per): Day  Blood glucose trend: Fluctuating    See Felecia data below     Taking Medications:  Diabetes Medication(s)     Antidiabetic Combinations       SitaGLIPtin-MetFORMIN HCl (JANUMET XR) 100-1000 MG TB24    Take 1 tablet by mouth daily          Taking Medication Assessed Today: Yes  Current Treatments: Oral Medication (taken by mouth)  Problems taking diabetes medications regularly?: No  Diabetes medication side effects?: Yes    Problem Solving:  Problem Solving Assessed Today: No    Reducing Risks:  Reducing Risks Assessed Today: No    Healthy Coping:  Healthy Coping Assessed Today: Yes  Emotional response to diabetes: Ready to learn, Concern for health and well-being  Informal Support system:: Spouse  Stage of change: ACTION (Actively working towards change)  Support resources: None  Patient Activation Measure Survey Score:  No flowsheet data found.    Diabetes knowledge and skills assessment:   Patient is knowledgeable in diabetes  management concepts related to: Healthy Eating, Being Active, Monitoring and Taking Medication    Patient needs further education on the following diabetes management concepts: Taking Medication, Problem Solving, Reducing Risks and Healthy Coping    Based on learning assessment above, most appropriate setting for further diabetes education would be: Group class or Individual setting.      INTERVENTIONS:    REPORTS:                  Education provided today on:  AADE Self-Care Behaviors:  Healthy Eating: consistency in amount, composition, and timing of food intake, portion control and being sure to include carbs at meals & snacks  Being Active: relationship to blood glucose, describe appropriate activity program and recommended having some food before working out in the morning  Monitoring: individual blood glucose targets and frequency of monitoring    Pt verbalized understanding of concepts discussed and recommendations provided today.       Education Materials Provided:  CEL-SCI Report     ASSESSMENT:  Patient comes in to review Felecia reports. Dr. España denied orders for Dexcom G6 to await results of LibrePro study. Spent time reviewing trends - patient had just started Janumet on 9/18, the 2nd day of study and over the 2 week period, it appears blood sugar trends are slowly improving with this med. Patient states she is tolerating it far better than metformin alone. She works out every morning around 5:20a and consistent spike in blood sugar is seen after her workouts each morning. Discussed having something small to eat before she works out. Other spikes appear to be related to food choices. She would still be interested in pursuing Dexcom, since it's well covered by her insurance but is agreeable to using glucometer if Dr. España denies this again. Will discuss and follow up with patient in the future.     Glucose Patterns & Trends:  Hyperglycemia, weekend- postmeal and weekday-  premeal/post-exercise      PLAN  Will review results with Dr. España, see if he think CGM is now appropriate. Will follow up with patient over the phone.     Andreia Seay RD, LD, [facility]   Time Spent: 30 minutes  Encounter Type: Individual    Any diabetes medication dose changes were made via the CDE Protocol and Collaborative Practice Agreement with the patient's referring provider. A copy of this encounter was shared with the provider.

## 2021-10-30 ENCOUNTER — HEALTH MAINTENANCE LETTER (OUTPATIENT)
Age: 61
End: 2021-10-30

## 2021-12-25 ENCOUNTER — HEALTH MAINTENANCE LETTER (OUTPATIENT)
Age: 61
End: 2021-12-25

## 2021-12-29 ENCOUNTER — TELEPHONE (OUTPATIENT)
Dept: ENDOCRINOLOGY | Facility: CLINIC | Age: 61
End: 2021-12-29
Payer: COMMERCIAL

## 2021-12-29 ENCOUNTER — TELEPHONE (OUTPATIENT)
Dept: EDUCATION SERVICES | Facility: CLINIC | Age: 61
End: 2021-12-29
Payer: COMMERCIAL

## 2021-12-29 DIAGNOSIS — E11.9 TYPE 2 DIABETES MELLITUS WITHOUT COMPLICATION, WITHOUT LONG-TERM CURRENT USE OF INSULIN (H): Primary | ICD-10-CM

## 2021-12-29 RX ORDER — PROCHLORPERAZINE 25 MG/1
SUPPOSITORY RECTAL
Qty: 1 EACH | Refills: 0 | OUTPATIENT
Start: 2021-12-29 | End: 2021-12-30

## 2021-12-29 RX ORDER — PROCHLORPERAZINE 25 MG/1
SUPPOSITORY RECTAL
Qty: 3 EACH | Refills: 11 | OUTPATIENT
Start: 2021-12-29 | End: 2021-12-30

## 2021-12-29 RX ORDER — PROCHLORPERAZINE 25 MG/1
SUPPOSITORY RECTAL
Qty: 1 EACH | Refills: 3 | OUTPATIENT
Start: 2021-12-29 | End: 2021-12-30

## 2021-12-29 NOTE — TELEPHONE ENCOUNTER
Prescription approved per [facility] Refill Protocol. Pt aware to set up an appt with CDE for education and pt says insurance will cover Dexcom .Kylie Garza RN

## 2021-12-29 NOTE — TELEPHONE ENCOUNTER
[facility] Call Center    Phone Message    May a detailed message be left on voicemail: yes     Reason for Call: Other: Pt is requesting call back from provider or his care team. Per pt she was been waiting to hear back about if he has approved her dexcom. Per pt she discussed using the dexcom CGM with Andreiadaniel Seay, but that provider has been on maternity leave (see 12/29/21 chart note from Esther Cantrell). Pt would like to get this approved before the end of the year, so please review and call pt back ASAP to discuss.      Action Taken: Message routed to:  Other: endocrine    Travel Screening: Not Applicable

## 2021-12-29 NOTE — TELEPHONE ENCOUNTER
[facility] Call Center    Phone Message    May a detailed message be left on voicemail: yes     Reason for Call: Order(s): Other:     Reason for requested: Per Patient is wanting to know if she is needing to have an A1C test done before appt with Dr. España. Please advise.     Patient would like to get a call back to know.     Date needed: n/a    Provider name: Buck      Action Taken: Message routed to:  [facility] Endo    Travel Screening: Not Applicable

## 2021-12-29 NOTE — TELEPHONE ENCOUNTER
Patient called triage line asking about the continuous glucose monitor that she discussed with Andreia Seay. Andreia is now on maternity leave, however per notes 9/30, Dr España had not approved a prescription for a Dexcom for this patient.    Called patient back and left message, encouraged her to have a conversation with Dr España about this prescription, as he would be the provider to sign off on it.     RIKA Guy [facility]

## 2021-12-30 ENCOUNTER — TELEPHONE (OUTPATIENT)
Dept: ENDOCRINOLOGY | Facility: CLINIC | Age: 61
End: 2021-12-30
Payer: COMMERCIAL

## 2021-12-30 DIAGNOSIS — E11.9 TYPE 2 DIABETES MELLITUS WITHOUT COMPLICATION, WITHOUT LONG-TERM CURRENT USE OF INSULIN (H): ICD-10-CM

## 2021-12-30 RX ORDER — PROCHLORPERAZINE 25 MG/1
SUPPOSITORY RECTAL
Qty: 3 EACH | Refills: 11 | Status: SHIPPED | OUTPATIENT
Start: 2021-12-30 | End: 2023-01-30

## 2021-12-30 RX ORDER — PROCHLORPERAZINE 25 MG/1
SUPPOSITORY RECTAL
Qty: 1 EACH | Refills: 0 | Status: SHIPPED | OUTPATIENT
Start: 2021-12-30 | End: 2023-10-25

## 2021-12-30 RX ORDER — PROCHLORPERAZINE 25 MG/1
SUPPOSITORY RECTAL
Qty: 1 EACH | Refills: 3 | Status: SHIPPED | OUTPATIENT
Start: 2021-12-30 | End: 2023-01-30

## 2021-12-30 NOTE — TELEPHONE ENCOUNTER
[facility] Call Center    Phone Message    May a detailed message be left on voicemail: yes     Reason for Call: Medication Question or concern regarding medication   Prescription Clarification  Name of Medication: Continuous Blood Gluc  (DEXCOM G6 ) LINDA  And   Continuous Blood Gluc Sensor (DEXCOM G6 SENSOR) MISC   And   Continuous Blood Gluc Transmit (DEXCOM G6 TRANSMITTER) MISC  Prescribing Provider: Taco   Pharmacy: [facility]/PHARMACY #[id number] [facility] [address] [place] [address]   What on the order needs clarification? Pt spoke with the nurse yesterday (see 12/29/21 chart note). Per pt she went to her pharmacy today, and they hadn t received the referral. Writer informed pt that per her chart it shows that the requests prescriptions were sent to her pharmacy yesterday. Writer also confirmed with Pt that they were sent to the correct pharmacy. Please resend these prescriptions since they were not received.            Action Taken: Message routed to:  Other: endocrine    Travel Screening: Not Applicable

## 2021-12-31 NOTE — TELEPHONE ENCOUNTER
Message noted.  I believe patient's last hgbA1c was done in 9/2021.  Yes, I recommend repeat lab testing for hgbA1c and lipid panel (fasting labs) in next few weeks, preappt.  I have placed these lab orders for testing at her nearest [facility] clinic.    Thanks for relaying message, plan.    THERESA España MD, MS  Endocrinology  [facility]

## 2022-01-03 NOTE — TELEPHONE ENCOUNTER
Spoke with patient and patient is aware of lab appointment with instructions to fast before lab appointment. Patient is also going to call and schedule a Diabetes Education Appointment to help set up Dexcom. Patient is going to make this appointment before her scheduled appointment with Dr. España on 2/15/2022.    Dorothy Rios MA

## 2022-01-31 ENCOUNTER — LAB (OUTPATIENT)
Dept: LAB | Facility: CLINIC | Age: 62
End: 2022-01-31
Payer: COMMERCIAL

## 2022-01-31 DIAGNOSIS — E11.9 TYPE 2 DIABETES MELLITUS WITHOUT COMPLICATION, WITHOUT LONG-TERM CURRENT USE OF INSULIN (H): ICD-10-CM

## 2022-01-31 LAB
CHOLEST SERPL-MCNC: 188 MG/DL
FASTING STATUS PATIENT QL REPORTED: YES
HBA1C MFR BLD: 6.7 % (ref 0–5.6)
HDLC SERPL-MCNC: 48 MG/DL
LDLC SERPL CALC-MCNC: 98 MG/DL
NONHDLC SERPL-MCNC: 140 MG/DL
TRIGL SERPL-MCNC: 209 MG/DL

## 2022-01-31 PROCEDURE — 80061 LIPID PANEL: CPT

## 2022-01-31 PROCEDURE — 36415 COLL VENOUS BLD VENIPUNCTURE: CPT

## 2022-01-31 PROCEDURE — 83036 HEMOGLOBIN GLYCOSYLATED A1C: CPT

## 2022-03-01 ENCOUNTER — ALLIED HEALTH/NURSE VISIT (OUTPATIENT)
Dept: EDUCATION SERVICES | Facility: CLINIC | Age: 62
End: 2022-03-01
Payer: COMMERCIAL

## 2022-03-01 DIAGNOSIS — E11.9 TYPE 2 DIABETES MELLITUS WITHOUT COMPLICATION, WITHOUT LONG-TERM CURRENT USE OF INSULIN (H): Primary | ICD-10-CM

## 2022-03-01 PROCEDURE — G0108 DIAB MANAGE TRN  PER INDIV: HCPCS | Performed by: DIETITIAN, REGISTERED

## 2022-03-01 NOTE — PROGRESS NOTES
Diabetes Self Management Training: Personal Continuous Glucose Monitor Start    Darcy Story presents today for initiation of personal continuous glucose monitoring with patient-owned device related to Type 2 diabetes.    She is accompanied by self    Patient's diabetes management related comments/concerns: Has the Dexcom G6 but needs help to start, had to reschedule visit as she had covid when last scheduled    Patient would like this visit to be focused around the following diabetes-related behaviors and goals: placing and starting G6    ASSESSMENT:    Current Diabetes Management per Patient:  Diabetes medications   yes:     Diabetes Medication(s)     Antidiabetic Combinations       SitaGLIPtin-MetFORMIN HCl (JANUMET XR) 100-1000 MG TB24    Take 1 tablet by mouth daily          *Abbreviated insulin dose documentation key: Insulin Trade Name (dcpmsvkvi-qazzk-xrxkhc-bedtime) - i.e. Humalog 5-5-5-0 (Humalog 5 units at breakfast, 5 units at lunch, and 5 units at dinner).    Patient glucose self monitoring as follows: not fully assessed, she shows me blisters on finger tips where she has been poking.   BG meter: not assessed  BG results: not available     BG values are: unable to assess  Patient's most recent   Lab Results   Component Value Date    A1C 6.7 01/31/2022    is meeting goal of <7.0    Patient experiencing hypoglycemia? no    Nutrition:  Not assessed today    Physical Activity:  Not assessed today    CGM Preparation:  Patient completed homework (online training and/or Getting started with CGM guide)? No    Labs:  Lab Results   Component Value Date    A1C 6.7 01/31/2022     Lab Results   Component Value Date     09/01/2021    GLC 99 12/27/2012       INTERVENTION:    Patient was able to demonstrate ability to insert and sensor and transmitter without difficulty- she had not charged the reader so voices intention to go home and charge it then start the device.   She would not like to use her  phone/the sajan as reader    Education provided on:  Dexcom CGM system - Dexcom sensor, transmitter, , Dexcom mobile applications, sensor glucose versus blood glucose, trends and graphs, alarms and alerts, Dexcom sensor insertion, calibrating, stopping sensor and changing sensor, Dexcom Clarity software - reviewed all, some parts briefly.   She says her  is tech savvy- provided [email or web address] and advised watch more videos there, contact them if need help, resources available.   Recommended schedule follow up, but she would like to see Endo first, then reschedule with Andreia/CDE    CGM initial settings:   Dexcom: discussed, but not set today as unable to start devicde    PLAN:  Change sensor, as directed.    Follow-up:    Follow-up with endocrinology recommended.  Encouraged to schedule for diabetes education   Chart routed to referring provider.    Lizeth Tanner RD, LD, [facility]    Time Spent: 30 minutes  Encounter Type: Individual    Any diabetes medication dose changes were made via the CDE Protocol and Collaborative Practice Agreement with the patient's endocrinology provider. A copy of this encounter was shared with the provider.

## 2022-03-01 NOTE — Clinical Note
Darcy is wearing her new Dexcom G6. She is scheduled to see you in June. I encouraged follow up with [facility] sooner for ongoing DB care and support.   Lizeth Tanner RD, LD, [facility]

## 2022-06-08 ENCOUNTER — OFFICE VISIT (OUTPATIENT)
Dept: ENDOCRINOLOGY | Facility: CLINIC | Age: 62
End: 2022-06-08
Payer: COMMERCIAL

## 2022-06-08 VITALS
DIASTOLIC BLOOD PRESSURE: 84 MMHG | HEIGHT: 60 IN | SYSTOLIC BLOOD PRESSURE: 151 MMHG | HEART RATE: 76 BPM | WEIGHT: 116.7 LBS | BODY MASS INDEX: 22.91 KG/M2

## 2022-06-08 DIAGNOSIS — E11.9 TYPE 2 DIABETES MELLITUS WITHOUT COMPLICATION, WITHOUT LONG-TERM CURRENT USE OF INSULIN (H): Primary | ICD-10-CM

## 2022-06-08 PROCEDURE — 95251 CONT GLUC MNTR ANALYSIS I&R: CPT | Performed by: INTERNAL MEDICINE

## 2022-06-08 PROCEDURE — 99214 OFFICE O/P EST MOD 30 MIN: CPT | Performed by: INTERNAL MEDICINE

## 2022-06-08 NOTE — PROGRESS NOTES
"  Recent issues:   Diabetes follow-up evaluation.  Now using the DexcomG6 CGM, likes it   Reports chronically high SG levels for many months  Reviewed medical history from patient, recent preappt labs, and Epic chart record        2017. Diagnosis of diabetes mellitus  Medical evaluations with Cedric TOM/Doc  Initial treatment with metformin mediation  Previous Doc labs include:        9/1/21. Initial diabetes evaluation with me at [place]  Reviewed health history and diabetes issues  Taking metformin 500 mg BID  Advised change to JanumetXR 100/1000 every day, then CDE eval and diagnostic Felecia    ~12/2021. Started use of DexcomG6 CGM, likes it  Current DM medications:  JanumetXR 100/1000  1-tab daily  .  Blood glucose (BG) meter One Touch   Tests BID   No meter data available today    Recent DexcomG6 data:              FamHx DM: mother  Recent [facility] labs include:  Lab Results   Component Value Date    A1C 6.7 (H) 01/31/2022     09/01/2021    POTASSIUM 4.6 09/01/2021    CHLORIDE 106 09/01/2021    CO2 28 09/01/2021    ANIONGAP 3 09/01/2021     (H) 09/01/2021    BUN 13 09/01/2021    CR 0.52 09/01/2021    GFRESTIMATED >90 09/01/2021    GFRESTBLACK >90 12/27/2012    ARIEL 9.2 09/01/2021    CHOL 188 01/31/2022    TRIG 209 (H) 01/31/2022    HDL 48 (L) 01/31/2022    LDL 98 01/31/2022    NHDL 140 (H) 01/31/2022    UCRR 97 09/01/2021    MICROL 13 09/01/2021    UMALCR 13.40 09/01/2021     Last eye exam 2019, but recent concerns about \"floaters\"  DM Complications: none      Lives in [place], 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 Harshal Parry/Doc for general medicine evaluations.  Also sees Dr. Mague Zavala/Allergist, Dr. Misael Murphy/rheumatology     PMH/PSH:  Past Medical History:   Diagnosis Date     Achilles tendon pain     previous right achilles surgery     Cervical pain (neck)     epidural     Chronic back pain      HTN (hypertension)      Hypertriglyceridemia      Near syncope      " Osteoarthritis      Osteopenia      Type 2 diabetes mellitus without complication (H)      Past Surgical History:   Procedure Laterality Date     EXCISE CYST BAKER'S       LAPAROTOMY, TUBAL LIGATION, COMBINED       REMOVE IMPLANT BREAST         Family Hx:  No family history on file.      Social Hx:  Social History     Socioeconomic History     Marital status:      Spouse name: Not on file     Number of children: Not on file     Years of education: Not on file     Highest education level: Not on file   Occupational History     Not on file   Tobacco Use     Smoking status: Never Smoker     Smokeless tobacco: Never Used   Substance and Sexual Activity     Alcohol use: Never     Drug use: Never     Sexual activity: Not on file   Other Topics Concern     Not on file   Social History Narrative     Not on file     Social Determinants of Health     Financial Resource Strain: Not on file   Food Insecurity: Not on file   Transportation Needs: Not on file   Physical Activity: Not on file   Stress: Not on file   Social Connections: Not on file   Intimate Partner Violence: Not on file   Housing Stability: Not on file          MEDICATIONS:  has a current medication list which includes the following prescription(s): cetirizine, dexcom g6 sensor, dexcom g6 transmitter, ibuprofen, sitagliptin-metformin, and dexcom g6 .    ROS:     ROS: 10 point ROS neg other than the symptoms noted above in the HPI.    GENERAL: mild fatigue, wt stable; denies fevers, chills, night sweats.   HEENT: no dysphagia, odonophagia, diplopia, neck pain  THYROID:  no apparent hyper or hypothyroid symptoms  CV: no chest pain, pressure, palpitations  LUNGS: no SOB, MILLER, cough, wheezing   ABDOMEN: no diarrhea, constipation, abdominal pain  EXTREMITIES: no rashes, ulcers, edema  NEUROLOGY: no headaches, denies changes in vision, tingling, extremitiy numbness   MSK: knees and shoulder pains; no muscle aches or pains, weakness  SKIN: no rashes or  lesions  :  no menses since age 53  PSYCH:  stable mood, no significant anxiety or depression  ENDOCRINE: no heat or cold intolerance    Physical Exam   VS: BP (!) 151/84   Pulse 76   Ht 1.524 m (5')   Wt 52.9 kg (116 lb 11.2 oz)   BMI 22.79 kg/m    GENERAL: AXOX3, NAD, short stature, well dressed, answering questions appropriately, appears stated age.  THYROID:  normal gland, no apparent nodules or goiter  HEENT: neck non-tender, no exopthalmous, no proptosis, EOMI  CV: RRR, no rubs, gallops, no murmurs  LUNGS: CTAB, no wheezes, rales, or ronchi  ABDOMEN: normal size, soft, nondistended  EXTREMITIES: no pedal edema  NEUROLOGY: CN grossly intact, no tremors  MSK: grossly intact  SKIN: darker skin complexion; no rashes, no lesions    LABS:    All pertinent notes, labs, and images personally reviewed by me.     A/P:  Encounter Diagnosis   Name Primary?     Type 2 diabetes mellitus without complication, without long-term current use of insulin (H) Yes       Comments:  Reviewed health history and diabetes issues.  Recent DexcomG6 SG trend high, probably chronic... I am surprised she didn't message or return for f/u advice  Reviewed and interpreted tests that I previously ordered.   Ordered appropriate tests for the endocrinology disease management.    Management options discussed and implemented after shared medical decision making with the patient.  T2DM problem is chronic with exacerbation progression, hyperglycemia    Plan:  Discussed general issues with the diabetes diagnosis and management  We discussed the hgbA1c test which reflects previous overall glucose levels or control  Discussed the importance of blood glucose (BG) testing to assess glucose trends  Provided general overview of the diabetes medication options and medication treatment plan  Reviewed recent DexcomG6 CGM glucose trend data, in detail.    Recommend:  Change to more aggressive T2DM med plan, reviewed several options  Plan med change to  JanumetXR 50/1000 as 2-tabs daily   Reviewed this medication and dosing   New Rx sent to pharmacy  Other options include switching from Januvia to a GLP1RA or adding a basal insulin  Goal target FBG  mg/dl  Continue use of DexcomG6 CGM, though needs to periodically assess glucose averages or TIR  Plan repeat lab tests soon   Discussed nearby [facility] clinic option   Lab orders placed  Keep focus on diet, exercise, weight management.  May be due for f/u eye exam soon  Advise having fasting lipid panel testing and dilated eye examination, at least annually    Addressed patient questions today    There are no Patient Instructions on file for this visit.    Future labs ordered today:   Orders Placed This Encounter   Procedures     GLUCOSE MONITOR, 72 HOUR, PHYS INTERP     Hemoglobin A1c     Basic metabolic panel     ALT     Albumin Random Urine Quantitative with Creat Ratio     TSH     Glutamic acid decarboxylase antibody     C-peptide     Radiology/Consults ordered today: None    Total time spent in with the patient evaluation:  18 min  Additional time spent reviewing pertinent lab tests and chart notes, and documentation:  10 min    Follow-up:  6/22/22 at 9:15 am, Return    THERESA España MD, MS  Endocrinology  [facility]    CC:  Dr. Harshal Parry/Doc

## 2022-06-08 NOTE — Clinical Note
"    6/8/2022         RE: Darcy Story  [address] [facility] [address]        Dear Colleague,    Thank you for referring your patient, Darcy Story, to the [facility] [place]. Please see a copy of my visit note below.      Recent issues:   Diabetes follow-up evaluation.  Now using the DexcomG6 CGM, likes it   Reports chronically high SG levels for many months  Reviewed medical history from patient, recent preappt labs, and Epic chart record        2017. Diagnosis of diabetes mellitus  Medical evaluations with Cedric TOM/Doc  Initial treatment with metformin mediation  Previous Doc labs include:        9/1/21. Initial diabetes evaluation with me at [place]  Reviewed health history and diabetes issues  Taking metformin 500 mg BID  Advised change to JanumetXR 100/1000 every day, then CDE eval and diagnostic Felecia    ~12/2021. Started use of DexcomG6 CGM, likes it  Current DM medications:  JanumetXR 100/1000  1-tab daily  .  Blood glucose (BG) meter One Touch   Tests BID   No meter data available today    Recent DexcomG6 data:              FamHx DM: mother  Recent [facility] labs include:  Lab Results   Component Value Date    A1C 6.7 (H) 01/31/2022     09/01/2021    POTASSIUM 4.6 09/01/2021    CHLORIDE 106 09/01/2021    CO2 28 09/01/2021    ANIONGAP 3 09/01/2021     (H) 09/01/2021    BUN 13 09/01/2021    CR 0.52 09/01/2021    GFRESTIMATED >90 09/01/2021    GFRESTBLACK >90 12/27/2012    ARIEL 9.2 09/01/2021    CHOL 188 01/31/2022    TRIG 209 (H) 01/31/2022    HDL 48 (L) 01/31/2022    LDL 98 01/31/2022    NHDL 140 (H) 01/31/2022    UCRR 97 09/01/2021    MICROL 13 09/01/2021    UMALCR 13.40 09/01/2021     Last eye exam 2019, but recent concerns about \"floaters\"  DM Complications: none      Lives in [place], 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 Harshal Parry/Doc for general medicine evaluations.  Also sees Dr. Mague Zavala/Allergist, Dr. Douglas " Jeffrey/rheumatology     PMH/PSH:  Past Medical History:   Diagnosis Date     Achilles tendon pain     previous right achilles surgery     Cervical pain (neck)     epidural     Chronic back pain      HTN (hypertension)      Hypertriglyceridemia      Near syncope      Osteoarthritis      Osteopenia      Type 2 diabetes mellitus without complication (H)      Past Surgical History:   Procedure Laterality Date     EXCISE CYST BAKER'S       LAPAROTOMY, TUBAL LIGATION, COMBINED       REMOVE IMPLANT BREAST         Family Hx:  No family history on file.      Social Hx:  Social History     Socioeconomic History     Marital status:      Spouse name: Not on file     Number of children: Not on file     Years of education: Not on file     Highest education level: Not on file   Occupational History     Not on file   Tobacco Use     Smoking status: Never Smoker     Smokeless tobacco: Never Used   Substance and Sexual Activity     Alcohol use: Never     Drug use: Never     Sexual activity: Not on file   Other Topics Concern     Not on file   Social History Narrative     Not on file     Social Determinants of Health     Financial Resource Strain: Not on file   Food Insecurity: Not on file   Transportation Needs: Not on file   Physical Activity: Not on file   Stress: Not on file   Social Connections: Not on file   Intimate Partner Violence: Not on file   Housing Stability: Not on file          MEDICATIONS:  has a current medication list which includes the following prescription(s): cetirizine, dexcom g6 sensor, dexcom g6 transmitter, ibuprofen, sitagliptin-metformin, and dexcom g6 .    ROS:     ROS: 10 point ROS neg other than the symptoms noted above in the HPI.    GENERAL: mild fatigue, wt stable; denies fevers, chills, night sweats.   HEENT: no dysphagia, odonophagia, diplopia, neck pain  THYROID:  no apparent hyper or hypothyroid symptoms  CV: no chest pain, pressure, palpitations  LUNGS: no SOB, MILLER, cough,  wheezing   ABDOMEN: no diarrhea, constipation, abdominal pain  EXTREMITIES: no rashes, ulcers, edema  NEUROLOGY: no headaches, denies changes in vision, tingling, extremitiy numbness   MSK: knees and shoulder pains; no muscle aches or pains, weakness  SKIN: no rashes or lesions  :  no menses since age 53  PSYCH:  stable mood, no significant anxiety or depression  ENDOCRINE: no heat or cold intolerance    Physical Exam   VS: BP (!) 151/84   Pulse 76   Ht 1.524 m (5')   Wt 52.9 kg (116 lb 11.2 oz)   BMI 22.79 kg/m    GENERAL: AXOX3, NAD, short stature, well dressed, answering questions appropriately, appears stated age.  THYROID:  normal gland, no apparent nodules or goiter  HEENT: neck non-tender, no exopthalmous, no proptosis, EOMI  CV: RRR, no rubs, gallops, no murmurs  LUNGS: CTAB, no wheezes, rales, or ronchi  ABDOMEN: normal size, soft, nondistended  EXTREMITIES: no pedal edema  NEUROLOGY: CN grossly intact, no tremors  MSK: grossly intact  SKIN: darker skin complexion; no rashes, no lesions    LABS:    All pertinent notes, labs, and images personally reviewed by me.     A/P:  Encounter Diagnosis   Name Primary?     Type 2 diabetes mellitus without complication, without long-term current use of insulin (H) Yes       Comments:  Reviewed health history and diabetes issues.  Recent DexcomG6 SG trend high, probably chronic... I am surprised she didn't message or return for f/u advice  Reviewed and interpreted tests that I previously ordered.   Ordered appropriate tests for the endocrinology disease management.    Management options discussed and implemented after shared medical decision making with the patient.  T2DM problem is chronic with exacerbation progression, hyperglycemia    Plan:  Discussed general issues with the diabetes diagnosis and management  We discussed the hgbA1c test which reflects previous overall glucose levels or control  Discussed the importance of blood glucose (BG) testing to assess  glucose trends  Provided general overview of the diabetes medication options and medication treatment plan  Reviewed recent DexcomG6 CGM glucose trend data, in detail.    Recommend:  Change to more aggressive T2DM med plan, reviewed several options  Plan med change to JanumetXR 50/1000 as 2-tabs daily   Reviewed this medication and dosing   New Rx sent to pharmacy  Other options include switching from Januvia to a GLP1RA or adding a basal insulin  Goal target FBG  mg/dl  Continue use of DexcomG6 CGM, though needs to periodically assess glucose averages or TIR  Plan repeat lab tests soon   Discussed nearby [facility] clinic option   Lab orders placed  Keep focus on diet, exercise, weight management.  May be due for f/u eye exam soon  Advise having fasting lipid panel testing and dilated eye examination, at least annually    Addressed patient questions today    There are no Patient Instructions on file for this visit.    Future labs ordered today:   Orders Placed This Encounter   Procedures     GLUCOSE MONITOR, 72 HOUR, PHYS INTERP     Hemoglobin A1c     Basic metabolic panel     ALT     Albumin Random Urine Quantitative with Creat Ratio     TSH     Glutamic acid decarboxylase antibody     C-peptide     Radiology/Consults ordered today: None    Total time spent in with the patient evaluation:  18 min  Additional time spent reviewing pertinent lab tests and chart notes, and documentation:  10 min    Follow-up:  6/22/22 at 9:15 am, Return    THERESA España MD, MS  Endocrinology  [facility]    CC:  Dr. Harshal Parry/Doc Reynolds, thank you for allowing me to participate in the care of your patient.        Sincerely,        Constantino España MD

## 2022-06-09 ENCOUNTER — TELEPHONE (OUTPATIENT)
Dept: ENDOCRINOLOGY | Facility: CLINIC | Age: 62
End: 2022-06-09
Payer: COMMERCIAL

## 2022-06-09 NOTE — TELEPHONE ENCOUNTER
[facility] Call Center    Phone Message    May a detailed message be left on voicemail: yes     Reason for Call: Medication Question or concern regarding medication   Prescription Clarification  Name of Medication: sitagliptin-metFORMIN (JANUMET)  MG tablet   Prescribing Provider: Taco  Pharmacy: [facility]/PHARMACY #[id number] [place], [place] - [address]  What on the order needs clarification? Patient wanting more clarification on above script and how much she is to take per day as the dosage change was discussed at last visit. Please advise. Thank you.          Action Taken: Message routed to:  Other: endo    Travel Screening: Not Applicable

## 2022-06-11 ENCOUNTER — HEALTH MAINTENANCE LETTER (OUTPATIENT)
Age: 62
End: 2022-06-11

## 2022-06-15 ENCOUNTER — LAB (OUTPATIENT)
Dept: LAB | Facility: CLINIC | Age: 62
End: 2022-06-15
Payer: COMMERCIAL

## 2022-06-15 DIAGNOSIS — E11.9 TYPE 2 DIABETES MELLITUS WITHOUT COMPLICATION, WITHOUT LONG-TERM CURRENT USE OF INSULIN (H): ICD-10-CM

## 2022-06-15 LAB — HBA1C MFR BLD: 7.1 % (ref 0–5.6)

## 2022-06-15 PROCEDURE — 84681 ASSAY OF C-PEPTIDE: CPT

## 2022-06-15 PROCEDURE — 82043 UR ALBUMIN QUANTITATIVE: CPT

## 2022-06-15 PROCEDURE — 86341 ISLET CELL ANTIBODY: CPT | Mod: 90

## 2022-06-15 PROCEDURE — 36415 COLL VENOUS BLD VENIPUNCTURE: CPT

## 2022-06-15 PROCEDURE — 83036 HEMOGLOBIN GLYCOSYLATED A1C: CPT

## 2022-06-15 PROCEDURE — 99000 SPECIMEN HANDLING OFFICE-LAB: CPT

## 2022-06-15 PROCEDURE — 80048 BASIC METABOLIC PNL TOTAL CA: CPT

## 2022-06-15 PROCEDURE — 84460 ALANINE AMINO (ALT) (SGPT): CPT

## 2022-06-15 PROCEDURE — 84443 ASSAY THYROID STIM HORMONE: CPT

## 2022-06-16 LAB
ALT SERPL W P-5'-P-CCNC: 22 U/L (ref 0–50)
ANION GAP SERPL CALCULATED.3IONS-SCNC: 6 MMOL/L (ref 3–14)
BUN SERPL-MCNC: 12 MG/DL (ref 7–30)
C PEPTIDE SERPL-MCNC: 1.8 NG/ML (ref 0.9–6.9)
CALCIUM SERPL-MCNC: 8.9 MG/DL (ref 8.5–10.1)
CHLORIDE BLD-SCNC: 108 MMOL/L (ref 94–109)
CO2 SERPL-SCNC: 24 MMOL/L (ref 20–32)
CREAT SERPL-MCNC: 0.53 MG/DL (ref 0.52–1.04)
CREAT UR-MCNC: 132 MG/DL
GFR SERPL CREATININE-BSD FRML MDRD: >90 ML/MIN/1.73M2
GLUCOSE BLD-MCNC: 166 MG/DL (ref 70–99)
MICROALBUMIN UR-MCNC: 13 MG/L
MICROALBUMIN/CREAT UR: 9.85 MG/G CR (ref 0–25)
POTASSIUM BLD-SCNC: 3.8 MMOL/L (ref 3.4–5.3)
SODIUM SERPL-SCNC: 138 MMOL/L (ref 133–144)
TSH SERPL DL<=0.005 MIU/L-ACNC: 0.59 MU/L (ref 0.4–4)

## 2022-06-17 LAB — GAD65 AB SER IA-ACNC: <5 IU/ML

## 2022-06-22 ENCOUNTER — OFFICE VISIT (OUTPATIENT)
Dept: ENDOCRINOLOGY | Facility: CLINIC | Age: 62
End: 2022-06-22
Payer: COMMERCIAL

## 2022-06-22 VITALS
BODY MASS INDEX: 22.95 KG/M2 | HEART RATE: 71 BPM | OXYGEN SATURATION: 100 % | WEIGHT: 117.5 LBS | DIASTOLIC BLOOD PRESSURE: 81 MMHG | SYSTOLIC BLOOD PRESSURE: 144 MMHG

## 2022-06-22 DIAGNOSIS — E11.9 TYPE 2 DIABETES MELLITUS WITHOUT COMPLICATION, WITHOUT LONG-TERM CURRENT USE OF INSULIN (H): Primary | ICD-10-CM

## 2022-06-22 PROCEDURE — 99214 OFFICE O/P EST MOD 30 MIN: CPT | Performed by: INTERNAL MEDICINE

## 2022-06-22 NOTE — PROGRESS NOTES
"  Recent issues:   Diabetes follow-up evaluation.  Taking the higher dose Janumet routine, reports improved SG trends with her DexcomG6  Feeling well overall  Reviewed preappt labs        2017. Diagnosis of diabetes mellitus  Medical evaluations with Cedric TOM/Doc  Initial treatment with metformin mediation  Previous Doc labs include:            9/1/21. Initial diabetes evaluation with me at [place]  Reviewed health history and diabetes issues  Taking metformin 500 mg BID  Advised change to JanumetXR 100/1000 every day, then CDE eval and diagnostic Felecia    ~12/2021. Started use of DexcomG6 CGM, likes it  Current DM medications:  JanumetXR 50/1000 1-tab in morning and evening  .  Blood glucose (BG) meter One Touch   Tests BID   No meter data available today    Recent DexcomG6 data:   No Clarity sajan data available... no Clarity sajan   She recalls usual -180 mg/dl range    FamHx DM: mother  Recent [facility]V labs include:  Lab Results   Component Value Date    A1C 7.1 (H) 06/15/2022     06/15/2022    POTASSIUM 3.8 06/15/2022    CHLORIDE 108 06/15/2022    CO2 24 06/15/2022    ANIONGAP 6 06/15/2022     (H) 06/15/2022    BUN 12 06/15/2022    CR 0.53 06/15/2022    GFRESTIMATED >90 06/15/2022    GFRESTBLACK >90 12/27/2012    ARIEL 8.9 06/15/2022    CPEPT 1.8 06/15/2022    CHOL 188 01/31/2022    TRIG 209 (H) 01/31/2022    HDL 48 (L) 01/31/2022    LDL 98 01/31/2022    NHDL 140 (H) 01/31/2022    UCRR 132 06/15/2022    MICROL 13 06/15/2022    UMALCR 9.85 06/15/2022    TSH 0.59 06/15/2022     Last eye exam 2019, but recent concerns about \"floaters\"  DM Complications: none      Lives in [place], [employer]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 Harshal Parry/Doc for general medicine evaluations.  Also sees Dr. Mague Zavala/Allergist, Dr. Misael Murphy/rheumatology     PMH/PSH:  Past Medical History:   Diagnosis Date     Achilles tendon pain     previous right achilles surgery     Cervical pain (neck)  "    epidural     Chronic back pain      HTN (hypertension)      Hypertriglyceridemia      Near syncope      Osteoarthritis      Osteopenia      Type 2 diabetes mellitus without complication (H)      Past Surgical History:   Procedure Laterality Date     EXCISE CYST BAKER'S       LAPAROTOMY, TUBAL LIGATION, COMBINED       REMOVE IMPLANT BREAST         Family Hx:  No family history on file.      Social Hx:  Social History     Socioeconomic History     Marital status:      Spouse name: Not on file     Number of children: Not on file     Years of education: Not on file     Highest education level: Not on file   Occupational History     Not on file   Tobacco Use     Smoking status: Never Smoker     Smokeless tobacco: Never Used   Substance and Sexual Activity     Alcohol use: Never     Drug use: Never     Sexual activity: Not on file   Other Topics Concern     Not on file   Social History Narrative     Not on file     Social Determinants of Health     Financial Resource Strain: Not on file   Food Insecurity: Not on file   Transportation Needs: Not on file   Physical Activity: Not on file   Stress: Not on file   Social Connections: Not on file   Intimate Partner Violence: Not on file   Housing Stability: Not on file          MEDICATIONS:  has a current medication list which includes the following prescription(s): cetirizine, dexcom g6 , dexcom g6 sensor, dexcom g6 transmitter, ibuprofen, and sitagliptin-metformin.    ROS:     ROS: 10 point ROS neg other than the symptoms noted above in the HPI.    GENERAL: mild fatigue, wt stable; denies fevers, chills, night sweats.   HEENT: no dysphagia, odonophagia, diplopia, neck pain  THYROID:  no apparent hyper or hypothyroid symptoms  CV: no chest pain, pressure, palpitations  LUNGS: no SOB, MILLER, cough, wheezing   ABDOMEN: no diarrhea, constipation, abdominal pain  EXTREMITIES: no rashes, ulcers, edema  NEUROLOGY: no headaches, denies changes in vision, tingling,  extremitiy numbness   MSK: knees and shoulder pains; no muscle aches or pains, weakness  SKIN: no rashes or lesions  :  no menses since age 53  PSYCH:  stable mood, no significant anxiety or depression  ENDOCRINE: no heat or cold intolerance    Physical Exam   VS: BP (!) 144/81   Pulse 71   Wt 53.3 kg (117 lb 8 oz)   SpO2 100%   BMI 22.95 kg/m    GENERAL: AXOX3, NAD, short stature, well dressed, answering questions appropriately, appears stated age.  THYROID:  normal gland, no apparent nodules or goiter  HEENT: neck non-tender, no exopthalmous, no proptosis, EOMI  LUNGS: no audible wheeze, cough or visible cyanosis, no visible retractions or increased work of breathing  ABDOMEN: normal size, soft, nondistended  EXTREMITIES: no pedal edema  NEUROLOGY: CN grossly intact, no tremors  MSK: grossly intact  SKIN: darker skin complexion; no rashes, no lesions    LABS:    All pertinent notes, labs, and images personally reviewed by me.     A/P:  Encounter Diagnosis   Name Primary?     Type 2 diabetes mellitus without complication, without long-term current use of insulin (H) Yes       Comments:  Reviewed health history and diabetes issues.  Recent DexcomG6 SG trends from her last 24-hr data improved  Reviewed and interpreted tests that I previously ordered.   Ordered appropriate tests for the endocrinology disease management.    Management options discussed and implemented after shared medical decision making with the patient.  T2DM problem is chronic-stable    Plan:  Discussed general issues with the diabetes diagnosis and management  We discussed the hgbA1c test which reflects previous overall glucose levels or control  Discussed the importance of blood glucose (BG) testing to assess glucose trends  Provided general overview of the diabetes medication options and medication treatment plan  Reviewed use of the DexcomG6 CGM, use of Clarity sajan data if available    Recommend:  Continue current Janumet 50/1000 BID dose  plan  Other options include switching from Januvia to a GLP1RA or adding a basal insulin  Goal target FBG  mg/dl  Continue use of DexcomG6 CGM   Encouraged her to download Clarity sajan to phone   Assess glucose averages or TIR  Plan repeat preappt lab tests in 9/2022   Discussed nearby [facility] clinic option   Lab orders placed  Keep focus on diet, exercise, weight management.  Advise having fasting lipid panel testing and dilated eye examination, at least annually    Addressed patient questions today    There are no Patient Instructions on file for this visit.    Future labs ordered today:   Orders Placed This Encounter   Procedures     Hemoglobin A1c     Basic metabolic panel     ALT     Radiology/Consults ordered today: None    Total time spent in with the patient evaluation:  15 min  Additional time spent reviewing pertinent lab tests and chart notes, and documentation:  5 min    Follow-up:  9/14/22 at 8:30 am, Return    THERESA España MD, MS  Endocrinology  [facility]    CC:  Dr. Harshal Parry/Doc

## 2022-06-22 NOTE — LETTER
"    6/22/2022         RE: Darcy Story  [address] [facility] [id number]        Dear Colleague,    Thank you for referring your patient, Darcy Story, to the [facility] [place]. Please see a copy of my visit note below.      Recent issues:   Diabetes follow-up evaluation.  Taking the higher dose Janumet routine, reports improved SG trends with her DexcomG6  Feeling well overall  Reviewed preappt labs        2017. Diagnosis of diabetes mellitus  Medical evaluations with Cedric TOM/Doc  Initial treatment with metformin mediation  Previous Doc labs include:            9/1/21. Initial diabetes evaluation with me at [place]  Reviewed health history and diabetes issues  Taking metformin 500 mg BID  Advised change to JanumetXR 100/1000 every day, then CDE eval and diagnostic Felecia    ~12/2021. Started use of DexcomG6 CGM, likes it  Current DM medications:  JanumetXR 50/1000 1-tab in morning and evening  .  Blood glucose (BG) meter One Touch   Tests BID   No meter data available today    Recent DexcomG6 data:   No Clarity sajan data available... no Clarity sajan   She recalls usual -180 mg/dl range    FamHx DM: mother  Recent [facility]V labs include:  Lab Results   Component Value Date    A1C 7.1 (H) 06/15/2022     06/15/2022    POTASSIUM 3.8 06/15/2022    CHLORIDE 108 06/15/2022    CO2 24 06/15/2022    ANIONGAP 6 06/15/2022     (H) 06/15/2022    BUN 12 06/15/2022    CR 0.53 06/15/2022    GFRESTIMATED >90 06/15/2022    GFRESTBLACK >90 12/27/2012    ARIEL 8.9 06/15/2022    CPEPT 1.8 06/15/2022    CHOL 188 01/31/2022    TRIG 209 (H) 01/31/2022    HDL 48 (L) 01/31/2022    LDL 98 01/31/2022    NHDL 140 (H) 01/31/2022    UCRR 132 06/15/2022    MICROL 13 06/15/2022    UMALCR 9.85 06/15/2022    TSH 0.59 06/15/2022     Last eye exam 2019, but recent concerns about \"floaters\"  DM Complications: none      Lives in [place], 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  Also sees Dr. Mague Zavala/Allergist, Dr. Misael Murphy/rheumatology     PMH/PSH:  Past Medical History:   Diagnosis Date     Achilles tendon pain     previous right achilles surgery     Cervical pain (neck)     epidural     Chronic back pain      HTN (hypertension)      Hypertriglyceridemia      Near syncope      Osteoarthritis      Osteopenia      Type 2 diabetes mellitus without complication (H)      Past Surgical History:   Procedure Laterality Date     EXCISE CYST BAKER'S       LAPAROTOMY, TUBAL LIGATION, COMBINED       REMOVE IMPLANT BREAST         Family Hx:  No family history on file.      Social Hx:  Social History     Socioeconomic History     Marital status:      Spouse name: Not on file     Number of children: Not on file     Years of education: Not on file     Highest education level: Not on file   Occupational History     Not on file   Tobacco Use     Smoking status: Never Smoker     Smokeless tobacco: Never Used   Substance and Sexual Activity     Alcohol use: Never     Drug use: Never     Sexual activity: Not on file   Other Topics Concern     Not on file   Social History Narrative     Not on file     Social Determinants of Health     Financial Resource Strain: Not on file   Food Insecurity: Not on file   Transportation Needs: Not on file   Physical Activity: Not on file   Stress: Not on file   Social Connections: Not on file   Intimate Partner Violence: Not on file   Housing Stability: Not on file          MEDICATIONS:  has a current medication list which includes the following prescription(s): cetirizine, dexcom g6 , dexcom g6 sensor, dexcom g6 transmitter, ibuprofen, and sitagliptin-metformin.    ROS:     ROS: 10 point ROS neg other than the symptoms noted above in the HPI.    GENERAL: mild fatigue, wt stable; denies fevers, chills, night sweats.   HEENT: no dysphagia, odonophagia, diplopia, neck pain  THYROID:  no apparent  hyper or hypothyroid symptoms  CV: no chest pain, pressure, palpitations  LUNGS: no SOB, MILLER, cough, wheezing   ABDOMEN: no diarrhea, constipation, abdominal pain  EXTREMITIES: no rashes, ulcers, edema  NEUROLOGY: no headaches, denies changes in vision, tingling, extremitiy numbness   MSK: knees and shoulder pains; no muscle aches or pains, weakness  SKIN: no rashes or lesions  :  no menses since age 53  PSYCH:  stable mood, no significant anxiety or depression  ENDOCRINE: no heat or cold intolerance    Physical Exam   VS: BP (!) 144/81   Pulse 71   Wt 53.3 kg (117 lb 8 oz)   SpO2 100%   BMI 22.95 kg/m    GENERAL: AXOX3, NAD, short stature, well dressed, answering questions appropriately, appears stated age.  THYROID:  normal gland, no apparent nodules or goiter  HEENT: neck non-tender, no exopthalmous, no proptosis, EOMI  LUNGS: no audible wheeze, cough or visible cyanosis, no visible retractions or increased work of breathing  ABDOMEN: normal size, soft, nondistended  EXTREMITIES: no pedal edema  NEUROLOGY: CN grossly intact, no tremors  MSK: grossly intact  SKIN: darker skin complexion; no rashes, no lesions    LABS:    All pertinent notes, labs, and images personally reviewed by me.     A/P:  Encounter Diagnosis   Name Primary?     Type 2 diabetes mellitus without complication, without long-term current use of insulin (H) Yes       Comments:  Reviewed health history and diabetes issues.  Recent DexcomG6 SG trends from her last 24-hr data improved  Reviewed and interpreted tests that I previously ordered.   Ordered appropriate tests for the endocrinology disease management.    Management options discussed and implemented after shared medical decision making with the patient.  T2DM problem is chronic-stable    Plan:  Discussed general issues with the diabetes diagnosis and management  We discussed the hgbA1c test which reflects previous overall glucose levels or control  Discussed the importance of blood  glucose (BG) testing to assess glucose trends  Provided general overview of the diabetes medication options and medication treatment plan  Reviewed use of the DexcomG6 CGM, use of Clarity sajan data if available    Recommend:  Continue current Janumet 50/1000 BID dose plan  Other options include switching from Januvia to a GLP1RA or adding a basal insulin  Goal target FBG  mg/dl  Continue use of DexcomG6 CGM   Encouraged her to download Clarity sajan to phone   Assess glucose averages or TIR  Plan repeat preappt lab tests in 9/2022   Discussed nearby [facility] clinic option   Lab orders placed  Keep focus on diet, exercise, weight management.  Advise having fasting lipid panel testing and dilated eye examination, at least annually    Addressed patient questions today    There are no Patient Instructions on file for this visit.    Future labs ordered today:   Orders Placed This Encounter   Procedures     Hemoglobin A1c     Basic metabolic panel     ALT     Radiology/Consults ordered today: None    Total time spent in with the patient evaluation:  15 min  Additional time spent reviewing pertinent lab tests and chart notes, and documentation:  5 min    Follow-up:  9/14/22 at 8:30 am, Return    THERESA España MD, MS  Endocrinology  [facility]    CC:  Dr. Harshal Parry/Doc [place], thank you for allowing me to participate in the care of your patient.        Sincerely,        Constantino España MD

## 2022-09-07 ENCOUNTER — LAB (OUTPATIENT)
Dept: LAB | Facility: CLINIC | Age: 62
End: 2022-09-07
Payer: COMMERCIAL

## 2022-09-07 DIAGNOSIS — E11.9 TYPE 2 DIABETES MELLITUS WITHOUT COMPLICATION, WITHOUT LONG-TERM CURRENT USE OF INSULIN (H): ICD-10-CM

## 2022-09-07 LAB
ALT SERPL W P-5'-P-CCNC: 24 U/L (ref 0–50)
ANION GAP SERPL CALCULATED.3IONS-SCNC: 5 MMOL/L (ref 3–14)
BUN SERPL-MCNC: 11 MG/DL (ref 7–30)
CALCIUM SERPL-MCNC: 8.8 MG/DL (ref 8.5–10.1)
CHLORIDE BLD-SCNC: 108 MMOL/L (ref 94–109)
CO2 SERPL-SCNC: 26 MMOL/L (ref 20–32)
CREAT SERPL-MCNC: 0.5 MG/DL (ref 0.52–1.04)
GFR SERPL CREATININE-BSD FRML MDRD: >90 ML/MIN/1.73M2
GLUCOSE BLD-MCNC: 174 MG/DL (ref 70–99)
HBA1C MFR BLD: 6.6 % (ref 0–5.6)
POTASSIUM BLD-SCNC: 4.4 MMOL/L (ref 3.4–5.3)
SODIUM SERPL-SCNC: 139 MMOL/L (ref 133–144)

## 2022-09-07 PROCEDURE — 84460 ALANINE AMINO (ALT) (SGPT): CPT

## 2022-09-07 PROCEDURE — 80048 BASIC METABOLIC PNL TOTAL CA: CPT

## 2022-09-07 PROCEDURE — 36415 COLL VENOUS BLD VENIPUNCTURE: CPT

## 2022-09-07 PROCEDURE — 83036 HEMOGLOBIN GLYCOSYLATED A1C: CPT

## 2022-09-14 ENCOUNTER — OFFICE VISIT (OUTPATIENT)
Dept: ENDOCRINOLOGY | Facility: CLINIC | Age: 62
End: 2022-09-14
Payer: COMMERCIAL

## 2022-09-14 VITALS
SYSTOLIC BLOOD PRESSURE: 138 MMHG | HEART RATE: 91 BPM | WEIGHT: 114.7 LBS | HEIGHT: 60 IN | OXYGEN SATURATION: 99 % | DIASTOLIC BLOOD PRESSURE: 76 MMHG | BODY MASS INDEX: 22.52 KG/M2

## 2022-09-14 DIAGNOSIS — E11.9 TYPE 2 DIABETES MELLITUS WITHOUT COMPLICATION, WITHOUT LONG-TERM CURRENT USE OF INSULIN (H): Primary | ICD-10-CM

## 2022-09-14 PROCEDURE — 99213 OFFICE O/P EST LOW 20 MIN: CPT | Performed by: INTERNAL MEDICINE

## 2022-09-14 NOTE — PROGRESS NOTES
"  Recent issues:   Diabetes follow-up evaluation.  Continues on the Janumet medication plan  Using the DexcomG6, but her Clarity sajan not linked to website, reason unclear  Feeling well overall        2017. Diagnosis of diabetes mellitus  Medical evaluations with Cedric TOM/Doc  Initial treatment with metformin mediation  Previous Doc labs include:            9/1/21. Initial diabetes evaluation with me at [place]  Reviewed health history and diabetes issues  Taking metformin 500 mg BID  Advised change to JanumetXR 100/1000 every day, then CDE eval and diagnostic Felecia    ~12/2021. Started use of DexcomG6 CGM, likes it  Current DM medications:  JanumetXR 50/1000 1-tab in morning and evening  .  Blood glucose (BG) meter One Touch   Tests BID   No meter data available today    Recent DexcomG6 data:   Limited CGM data available today   No Clarity sajan data available    FamHx DM: mother  Recent [facility]V labs include:  Lab Results   Component Value Date    A1C 6.6 (H) 09/07/2022     09/07/2022    POTASSIUM 4.4 09/07/2022    CHLORIDE 108 09/07/2022    CO2 26 09/07/2022    ANIONGAP 5 09/07/2022     (H) 09/07/2022    BUN 11 09/07/2022    CR 0.50 (L) 09/07/2022    GFRESTIMATED >90 09/07/2022    GFRESTBLACK >90 12/27/2012    ARIEL 8.8 09/07/2022    CPEPT 1.8 06/15/2022    CHOL 188 01/31/2022    TRIG 209 (H) 01/31/2022    HDL 48 (L) 01/31/2022    LDL 98 01/31/2022    NHDL 140 (H) 01/31/2022    UCRR 132 06/15/2022    MICROL 13 06/15/2022    UMALCR 9.85 06/15/2022    TSH 0.59 06/15/2022     Last eye exam 2019, but recent concerns about \"floaters\"  DM Complications: none      Lives in [place], [facility]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 Harshal Parry/Doc for general medicine evaluations.  Also sees Dr. Mague Zavala/Allergist, Dr. Misael Murphy/rheumatology     PMH/PSH:  Past Medical History:   Diagnosis Date     Achilles tendon pain     previous right achilles surgery     Cervical pain (neck)     " epidural     Chronic back pain      HTN (hypertension)      Hypertriglyceridemia      Near syncope      Osteoarthritis      Osteopenia      Type 2 diabetes mellitus without complication (H)      Past Surgical History:   Procedure Laterality Date     EXCISE CYST BAKER'S       LAPAROTOMY, TUBAL LIGATION, COMBINED       REMOVE IMPLANT BREAST         Family Hx:  No family history on file.      Social Hx:  Social History     Socioeconomic History     Marital status:      Spouse name: Not on file     Number of children: Not on file     Years of education: Not on file     Highest education level: Not on file   Occupational History     Not on file   Tobacco Use     Smoking status: Never Smoker     Smokeless tobacco: Never Used   Substance and Sexual Activity     Alcohol use: Never     Drug use: Never     Sexual activity: Not on file   Other Topics Concern     Not on file   Social History Narrative     Not on file     Social Determinants of Health     Financial Resource Strain: Not on file   Food Insecurity: Not on file   Transportation Needs: Not on file   Physical Activity: Not on file   Stress: Not on file   Social Connections: Not on file   Intimate Partner Violence: Not on file   Housing Stability: Not on file          MEDICATIONS:  has a current medication list which includes the following prescription(s): cetirizine, dexcom g6 , dexcom g6 sensor, dexcom g6 transmitter, ibuprofen, and sitagliptin-metformin.    ROS:     ROS: 10 point ROS neg other than the symptoms noted above in the HPI.    GENERAL: mild fatigue, wt stable; denies fevers, chills, night sweats.   HEENT: no dysphagia, odonophagia, diplopia, neck pain  THYROID:  no apparent hyper or hypothyroid symptoms  CV: no chest pain, pressure, palpitations  LUNGS: no SOB, MILLER, cough, wheezing   ABDOMEN: no diarrhea, constipation, abdominal pain  EXTREMITIES: no rashes, ulcers, edema  NEUROLOGY: no headaches, denies changes in vision, tingling,  extremitiy numbness   MSK: knees and shoulder pains; no muscle aches or pains, weakness  SKIN: no rashes or lesions  :  no menses since age 53  PSYCH:  stable mood, no significant anxiety or depression  ENDOCRINE: no heat or cold intolerance    Physical Exam   VS: /76   Pulse 91   Ht 1.524 m (5')   Wt 52 kg (114 lb 11.2 oz)   SpO2 99%   BMI 22.40 kg/m    GENERAL: AXOX3, NAD, short stature, well dressed, answering questions appropriately, appears stated age.  THYROID:  normal gland, no apparent nodules or goiter  HEENT: neck non-tender, no exopthalmous, no proptosis, EOMI  LUNGS: no audible wheeze, cough or visible cyanosis, no visible retractions or increased work of breathing  ABDOMEN: normal size, soft, nondistended  EXTREMITIES: no pedal edema  NEUROLOGY: CN grossly intact, no tremors  MSK: grossly intact  SKIN: darker skin complexion; no rashes, no lesions    LABS:    All pertinent notes, labs, and images personally reviewed by me.     A/P:  Encounter Diagnosis   Name Primary?     Type 2 diabetes mellitus without complication, without long-term current use of insulin (H) Yes       Comments:  Reviewed health history and diabetes issues.  Difficult to assess glycemic control without recent glucose trend data, though hgbA1c level good  Reviewed and interpreted tests that I previously ordered.   Ordered appropriate tests for the endocrinology disease management.    Management options discussed and implemented after shared medical decision making with the patient.  T2DM problem is chronic-stable    Plan:  Discussed general issues with the diabetes diagnosis and management  We discussed the hgbA1c test which reflects previous overall glucose levels or control  Discussed the importance of blood glucose (BG) testing to assess glucose trends  Provided general overview of the diabetes medication options and medication treatment plan  Reviewed use of the DexcomG6 CGM, use of Clarity sajan data if  available    Recommend:  Continue current Janumet 50/1000 but try 1-tab daily for 1-week  Monitor for GI symptom changes with the lower metformin (Janumet) dose  Will need to resume higher dose Janumet or change to alternative med plan after 1-week trial period  Consider use of a GLP1RA or adding a basal insulin  Goal target FBG  mg/dl  Continue use of DexcomG6 CGM   Encouraged her to download Clarity sajan to phone   Assess glucose averages or TIR   Check on status of Clarity sajan with  or call Dexcom patient assistance  Plan repeat preappt lab tests in 1/2023   Discussed nearby [facility] clinic option   Lab orders placed  Keep focus on diet, exercise, weight management.  Advise having fasting lipid panel testing and dilated eye examination, at least annually    Addressed patient questions today    There are no Patient Instructions on file for this visit.    Future labs ordered today:   Orders Placed This Encounter   Procedures     Hemoglobin A1c     Basic metabolic panel     ALT     Radiology/Consults ordered today: None    Total time spent in with the patient evaluation:  15 min  Additional time spent reviewing pertinent lab tests and chart notes, and documentation:  5 min    Follow-up:  1/2023, Return    THERESA España MD, MS  Endocrinology  [facility]

## 2022-09-14 NOTE — LETTER
"    9/14/2022         RE: Darcy Story  [address] [facility] [address]        Dear Colleague,    Thank you for referring your patient, Darcy Story, to the [facility] [place]. Please see a copy of my visit note below.      Recent issues:   Diabetes follow-up evaluation.  Continues on the Janumet medication plan  Using the DexcomG6, but her Clarity sajan not linked to website, reason unclear  Feeling well overall        2017. Diagnosis of diabetes mellitus  Medical evaluations with Cedric TOM/Doc  Initial treatment with metformin mediation  Previous [facility] labs include:            9/1/21. Initial diabetes evaluation with me at [place]  Reviewed health history and diabetes issues  Taking metformin 500 mg BID  Advised change to JanumetXR 100/1000 every day, then CDE eval and diagnostic Felecia    ~12/2021. Started use of DexcomG6 CGM, likes it  Current DM medications:  JanumetXR 50/1000 1-tab in morning and evening  .  Blood glucose (BG) meter One Touch   Tests BID   No meter data available today    Recent DexcomG6 data:   Limited CGM data available today   No Clarity sajan data available    FamHx DM: mother  Recent [facility] labs include:  Lab Results   Component Value Date    A1C 6.6 (H) 09/07/2022     09/07/2022    POTASSIUM 4.4 09/07/2022    CHLORIDE 108 09/07/2022    CO2 26 09/07/2022    ANIONGAP 5 09/07/2022     (H) 09/07/2022    BUN 11 09/07/2022    CR 0.50 (L) 09/07/2022    GFRESTIMATED >90 09/07/2022    GFRESTBLACK >90 12/27/2012    ARIEL 8.8 09/07/2022    CPEPT 1.8 06/15/2022    CHOL 188 01/31/2022    TRIG 209 (H) 01/31/2022    HDL 48 (L) 01/31/2022    LDL 98 01/31/2022    NHDL 140 (H) 01/31/2022    UCRR 132 06/15/2022    MICROL 13 06/15/2022    UMALCR 9.85 06/15/2022    TSH 0.59 06/15/2022     Last eye exam 2019, but recent concerns about \"floaters\"  DM Complications: none      Lives in [place], 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 Caputo " Brinda/Doc for general medicine evaluations.  Also sees Dr. Mague Zavala/Allergist, Dr. Misael Murphy/rheumatology     PMH/PSH:  Past Medical History:   Diagnosis Date     Achilles tendon pain     previous right achilles surgery     Cervical pain (neck)     epidural     Chronic back pain      HTN (hypertension)      Hypertriglyceridemia      Near syncope      Osteoarthritis      Osteopenia      Type 2 diabetes mellitus without complication (H)      Past Surgical History:   Procedure Laterality Date     EXCISE CYST BAKER'S       LAPAROTOMY, TUBAL LIGATION, COMBINED       REMOVE IMPLANT BREAST         Family Hx:  No family history on file.      Social Hx:  Social History     Socioeconomic History     Marital status:      Spouse name: Not on file     Number of children: Not on file     Years of education: Not on file     Highest education level: Not on file   Occupational History     Not on file   Tobacco Use     Smoking status: Never Smoker     Smokeless tobacco: Never Used   Substance and Sexual Activity     Alcohol use: Never     Drug use: Never     Sexual activity: Not on file   Other Topics Concern     Not on file   Social History Narrative     Not on file     Social Determinants of Health     Financial Resource Strain: Not on file   Food Insecurity: Not on file   Transportation Needs: Not on file   Physical Activity: Not on file   Stress: Not on file   Social Connections: Not on file   Intimate Partner Violence: Not on file   Housing Stability: Not on file          MEDICATIONS:  has a current medication list which includes the following prescription(s): cetirizine, dexcom g6 , dexcom g6 sensor, dexcom g6 transmitter, ibuprofen, and sitagliptin-metformin.    ROS:     ROS: 10 point ROS neg other than the symptoms noted above in the HPI.    GENERAL: mild fatigue, wt stable; denies fevers, chills, night sweats.   HEENT: no dysphagia, odonophagia, diplopia, neck pain  THYROID:  no apparent hyper  or hypothyroid symptoms  CV: no chest pain, pressure, palpitations  LUNGS: no SOB, MILLER, cough, wheezing   ABDOMEN: no diarrhea, constipation, abdominal pain  EXTREMITIES: no rashes, ulcers, edema  NEUROLOGY: no headaches, denies changes in vision, tingling, extremitiy numbness   MSK: knees and shoulder pains; no muscle aches or pains, weakness  SKIN: no rashes or lesions  :  no menses since age 53  PSYCH:  stable mood, no significant anxiety or depression  ENDOCRINE: no heat or cold intolerance    Physical Exam   VS: /76   Pulse 91   Ht 1.524 m (5')   Wt 52 kg (114 lb 11.2 oz)   SpO2 99%   BMI 22.40 kg/m    GENERAL: AXOX3, NAD, short stature, well dressed, answering questions appropriately, appears stated age.  THYROID:  normal gland, no apparent nodules or goiter  HEENT: neck non-tender, no exopthalmous, no proptosis, EOMI  LUNGS: no audible wheeze, cough or visible cyanosis, no visible retractions or increased work of breathing  ABDOMEN: normal size, soft, nondistended  EXTREMITIES: no pedal edema  NEUROLOGY: CN grossly intact, no tremors  MSK: grossly intact  SKIN: darker skin complexion; no rashes, no lesions    LABS:    All pertinent notes, labs, and images personally reviewed by me.     A/P:  Encounter Diagnosis   Name Primary?     Type 2 diabetes mellitus without complication, without long-term current use of insulin (H) Yes       Comments:  Reviewed health history and diabetes issues.  Difficult to assess glycemic control without recent glucose trend data, though hgbA1c level good  Reviewed and interpreted tests that I previously ordered.   Ordered appropriate tests for the endocrinology disease management.    Management options discussed and implemented after shared medical decision making with the patient.  T2DM problem is chronic-stable    Plan:  Discussed general issues with the diabetes diagnosis and management  We discussed the hgbA1c test which reflects previous overall glucose levels or  control  Discussed the importance of blood glucose (BG) testing to assess glucose trends  Provided general overview of the diabetes medication options and medication treatment plan  Reviewed use of the DexcomG6 CGM, use of Clarity sajan data if available    Recommend:  Continue current Janumet 50/1000 but try 1-tab daily for 1-week  Monitor for GI symptom changes with the lower metformin (Janumet) dose  Will need to resume higher dose Janumet or change to alternative med plan after 1-week trial period  Consider use of a GLP1RA or adding a basal insulin  Goal target FBG  mg/dl  Continue use of DexcomG6 CGM   Encouraged her to download Clarity sajan to phone   Assess glucose averages or TIR   Check on status of Clarity sajan with  or call Dexcom patient assistance  Plan repeat preappt lab tests in 1/2023   Discussed nearby [facility] clinic option   Lab orders placed  Keep focus on diet, exercise, weight management.  Advise having fasting lipid panel testing and dilated eye examination, at least annually    Addressed patient questions today    There are no Patient Instructions on file for this visit.    Future labs ordered today:   Orders Placed This Encounter   Procedures     Hemoglobin A1c     Basic metabolic panel     ALT     Radiology/Consults ordered today: None    Total time spent in with the patient evaluation:  15 min  Additional time spent reviewing pertinent lab tests and chart notes, and documentation:  5 min    Follow-up:  1/2023, Return    THERESA España MD, MS  Endocrinology  [facility]              Again, thank you for allowing me to participate in the care of your patient.        Sincerely,        Constantino España MD

## 2022-09-22 ENCOUNTER — TELEPHONE (OUTPATIENT)
Dept: ENDOCRINOLOGY | Facility: CLINIC | Age: 62
End: 2022-09-22

## 2022-09-22 NOTE — TELEPHONE ENCOUNTER
[facility] Call Center    Phone Message    May a detailed message be left on voicemail: yes     Reason for Call: Other: patient started taking one pill after her conversation with provider.  She has resolved her issue of having stomach issues.  Should she stay on just one pill?  Thank you.     Action Taken: Other: Endo    Travel Screening: Not Applicable

## 2022-09-26 NOTE — TELEPHONE ENCOUNTER
Message noted. She had been on Janumet 50/1000 as 1-tab BID then changed to 1-tab every day due to GI symptoms.... thought related to the metformin intake.  Yes, I would stay on the Janumet 1-tab every day dose plan.  If the fasting glucose stays over 150 mg/dl, she should message me.    Please relay message, thanks.    THERESA España MD, MS  [facility]

## 2022-10-22 ENCOUNTER — HEALTH MAINTENANCE LETTER (OUTPATIENT)
Age: 62
End: 2022-10-22

## 2022-11-03 ENCOUNTER — TELEPHONE (OUTPATIENT)
Dept: ENDOCRINOLOGY | Facility: CLINIC | Age: 62
End: 2022-11-03

## 2022-11-03 NOTE — TELEPHONE ENCOUNTER
[facility] Call Center    Phone Message    May a detailed message be left on voicemail: yes     Reason for Call: Medication Question or concern regarding medication   Prescription Clarification    Name of Medication: sitagliptin-metFORMIN (JANUMET)  MG tablet     Prescribing Provider: Taco    What on the order needs clarification?   Pt stated since taking just one pill she is having high blood sugar readings (400, 325, etc). Per pt, she has been eating right but still cannot get it to normal levels. Pt wanting a call back. Please advise. Thank you        Action Taken: Message routed to:  Other: endo    Travel Screening: Not Applicable

## 2022-11-03 NOTE — TELEPHONE ENCOUNTER
"Spoke with patient.  Taking Janumet 50/1000 daily.  Recently reduced from BID to every day on 9/14/22 due to diarrhea.  She states her fasting BS are on average are between 200-275 first thing in the morning. BS in the afternoon (2-3pm) can range from 290-400.  She indicates closer to the 400 range is more typical for her. BS prior to bed can range 290-320.  She denies any headaches, diarrhea, vision issues, infections, increased urination etc.  States she is eating \"healthly\", no sugared soda, water only.  Last Hgb A1C 6.6 on 9/7/22.  Last notes indicate recheck labs in 1/20/23.  Next appt with you 1/16/2023.  Patient recently saw PCP and he recommended she follow up with you.  See notes under CareEverywhere. Pt wondering about a possible medication change. Please advise.     Bruna Rogers RN     "

## 2022-11-09 NOTE — TELEPHONE ENCOUNTER
Message noted, indicates the lower dose Janumet taken to reduce the diarrhea GI symptom, but BG trends higher.  Her last hgbA1c good at 6.6% in 9/2022.    I recommend she review the management with the [facility] Diabetes Educator and if confirmed hyperglycemia, add glimeparide 1 mg as 1/2 to full tablet daily (starting with 1/2-tablet amount).  Message relayed to CDE, appreciate her assistance.    THERESA España MD, MS  Endocrinology  [facility]

## 2022-11-10 DIAGNOSIS — E11.9 TYPE 2 DIABETES MELLITUS WITHOUT COMPLICATION, WITHOUT LONG-TERM CURRENT USE OF INSULIN (H): Primary | ICD-10-CM

## 2022-11-15 ENCOUNTER — TRANSFERRED RECORDS (OUTPATIENT)
Dept: MULTI SPECIALTY CLINIC | Facility: CLINIC | Age: 62
End: 2022-11-15

## 2023-01-28 DIAGNOSIS — E11.9 TYPE 2 DIABETES MELLITUS WITHOUT COMPLICATION, WITHOUT LONG-TERM CURRENT USE OF INSULIN (H): ICD-10-CM

## 2023-01-29 DIAGNOSIS — E11.9 TYPE 2 DIABETES MELLITUS WITHOUT COMPLICATION, WITHOUT LONG-TERM CURRENT USE OF INSULIN (H): ICD-10-CM

## 2023-01-30 RX ORDER — PROCHLORPERAZINE 25 MG/1
SUPPOSITORY RECTAL
Qty: 3 EACH | Refills: 3 | Status: SHIPPED | OUTPATIENT
Start: 2023-01-30 | End: 2023-10-25

## 2023-01-30 RX ORDER — PROCHLORPERAZINE 25 MG/1
SUPPOSITORY RECTAL
Qty: 1 EACH | Refills: 1 | Status: SHIPPED | OUTPATIENT
Start: 2023-01-30 | End: 2023-10-25

## 2023-01-30 RX ORDER — LINAGLIPTIN AND METFORMIN HYDROCHLORIDE 2.5; 1 MG/1; MG/1
1 TABLET, FILM COATED ORAL 2 TIMES DAILY WITH MEALS
Qty: 180 TABLET | Refills: 1 | Status: SHIPPED | OUTPATIENT
Start: 2023-01-30 | End: 2023-07-10

## 2023-01-30 NOTE — TELEPHONE ENCOUNTER
"Last Written Prescription Date:  12/20/21  Last Fill Quantity: 1/3 and 3/11   Last office visit: 9/14/2022 with prescribing provider: Dr. España  Future Office Visit:  Due back 1/23        Requested Prescriptions   Pending Prescriptions Disp Refills     Continuous Blood Gluc Sensor (DEXCOM G6 SENSOR) MISC [Pharmacy Med Name: DEXCOM G6 SENSOR] 3 each 11     Sig: CHANGE EVERY 10 DAYS       There is no refill protocol information for this order        Continuous Blood Gluc Transmit (DEXCOM G6 TRANSMITTER) MISC [Pharmacy Med Name: DEXCOM G6 TRANSMITTER] 1 each 3     Sig: CHANGE EVERY 3 MONTHS       Diabetic Supplies Protocol Passed - 1/29/2023  3:54 PM        Passed - Medication is active on med list        Passed - Patient is 18 years of age or older        Passed - Recent (6 mo) or future (30 days) visit within the authorizing provider's specialty     Patient had office visit in the last 6 months or has a visit in the next 30 days with authorizing provider.  See \"Patient Info\" tab in inbasket, or \"Choose Columns\" in Meds & Orders section of the refill encounter.               Refills sent with reminder to make appt  Yvonne Holliday RN    "

## 2023-01-31 ENCOUNTER — MYC MEDICAL ADVICE (OUTPATIENT)
Dept: PULMONOLOGY | Facility: CLINIC | Age: 63
End: 2023-01-31
Payer: COMMERCIAL

## 2023-01-31 NOTE — TELEPHONE ENCOUNTER
Patient has been on Janumet but insurance formulary change to preferred Jentadueto.  Will change to Jentadueto 2.5/1000 as 1-tab BID, #180, R1.  New Rx sent to her [facility] pharmacy.  Please notify patient of change in med plan.    THERESA España MD, MS  Endocrinology  [facility]

## 2023-02-08 ENCOUNTER — TRANSFERRED RECORDS (OUTPATIENT)
Dept: MULTI SPECIALTY CLINIC | Facility: CLINIC | Age: 63
End: 2023-02-08

## 2023-02-08 LAB — RETINOPATHY: NORMAL

## 2023-02-09 ENCOUNTER — TELEPHONE (OUTPATIENT)
Dept: ENDOCRINOLOGY | Facility: CLINIC | Age: 63
End: 2023-02-09
Payer: COMMERCIAL

## 2023-02-09 NOTE — TELEPHONE ENCOUNTER
[facility] Call Center    Phone Message    May a detailed message be left on voicemail: yes     Reason for Call: Other: .      Per Patient is wanting to get a call back. Patient states her primary doctor had put in an order for an a1c test for patient and it was really high. Patient states the primary doctor prescribed patient Metformin and Jardiance. Patient is wanting to know if she is needing to take the medications that were prescribed by primary or if she is needing to take the medication that was recently prescribed by Dr. España.     Patient is also wanting to know when she should schedule lab work and an appt with Dr. España as patient stated she had cancelled the appts in January. Please advise.     Action Taken: Message routed to:  [facility] Endo    Travel Screening: Not Applicable

## 2023-02-11 NOTE — TELEPHONE ENCOUNTER
Message reviewed. I don't have any details of the evaluation and lab testing that the patient had done with her PCP (had previously seen Dr. Harshal Parry/Doc). Since patient cancelled her 1/2023 appt with me, I recommend she follow the advice from her PCP at this time.    If patient wishes to review the diabetes management with me in the future, she will need to schedule an appointment with me (video visit or office appt) and we can reassess the management plan. Please call patient with feedback.    THERESA España MD, MS  Endocrinology  [facility]

## 2023-02-13 NOTE — TELEPHONE ENCOUNTER
Called pt and reviewed provider msg.  Pt states she is only taking the Jentadueto at this time. States her blood sugars have been good with it. She has a lab appt and appt with Dr. España scheduled for next month.  Yvonne Holliday RN

## 2023-03-07 ENCOUNTER — LAB (OUTPATIENT)
Dept: LAB | Facility: CLINIC | Age: 63
End: 2023-03-07
Payer: COMMERCIAL

## 2023-03-07 DIAGNOSIS — E11.9 TYPE 2 DIABETES MELLITUS WITHOUT COMPLICATION, WITHOUT LONG-TERM CURRENT USE OF INSULIN (H): ICD-10-CM

## 2023-03-07 LAB
ALT SERPL W P-5'-P-CCNC: 16 U/L (ref 10–35)
ANION GAP SERPL CALCULATED.3IONS-SCNC: 10 MMOL/L (ref 7–15)
BUN SERPL-MCNC: 12.1 MG/DL (ref 8–23)
CALCIUM SERPL-MCNC: 9.5 MG/DL (ref 8.8–10.2)
CHLORIDE SERPL-SCNC: 104 MMOL/L (ref 98–107)
CREAT SERPL-MCNC: 0.54 MG/DL (ref 0.51–0.95)
DEPRECATED HCO3 PLAS-SCNC: 25 MMOL/L (ref 22–29)
GFR SERPL CREATININE-BSD FRML MDRD: >90 ML/MIN/1.73M2
GLUCOSE SERPL-MCNC: 289 MG/DL (ref 70–99)
HBA1C MFR BLD: 7.4 % (ref 0–5.6)
POTASSIUM SERPL-SCNC: 3.8 MMOL/L (ref 3.4–5.3)
SODIUM SERPL-SCNC: 139 MMOL/L (ref 136–145)

## 2023-03-07 PROCEDURE — 36415 COLL VENOUS BLD VENIPUNCTURE: CPT

## 2023-03-07 PROCEDURE — 84460 ALANINE AMINO (ALT) (SGPT): CPT

## 2023-03-07 PROCEDURE — 80048 BASIC METABOLIC PNL TOTAL CA: CPT

## 2023-03-07 PROCEDURE — 83036 HEMOGLOBIN GLYCOSYLATED A1C: CPT

## 2023-03-09 ENCOUNTER — OFFICE VISIT (OUTPATIENT)
Dept: ENDOCRINOLOGY | Facility: CLINIC | Age: 63
End: 2023-03-09
Payer: COMMERCIAL

## 2023-03-09 VITALS
WEIGHT: 112.5 LBS | BODY MASS INDEX: 22.09 KG/M2 | HEIGHT: 60 IN | DIASTOLIC BLOOD PRESSURE: 82 MMHG | HEART RATE: 78 BPM | SYSTOLIC BLOOD PRESSURE: 155 MMHG

## 2023-03-09 DIAGNOSIS — E11.9 TYPE 2 DIABETES MELLITUS WITHOUT COMPLICATION, WITHOUT LONG-TERM CURRENT USE OF INSULIN (H): Primary | ICD-10-CM

## 2023-03-09 PROCEDURE — 99214 OFFICE O/P EST MOD 30 MIN: CPT | Performed by: INTERNAL MEDICINE

## 2023-03-09 PROCEDURE — 95251 CONT GLUC MNTR ANALYSIS I&R: CPT | Performed by: INTERNAL MEDICINE

## 2023-03-09 NOTE — PROGRESS NOTES
"  Recent issues:   Diabetes follow-up evaluation.  States her new PCP Dr. Parry switched her to Jardiance, but significant symptoms after 1st dose and stopped it  Switched from JanumetXR to Jentadueto medications in early 2/2023, per plan  Feeling well overall, no diarrhea concerns and no nocturia noted         2017. Diagnosis of diabetes mellitus  Medical evaluations with Cedric TOM/Doc  Initial treatment with metformin mediation  Previous [facility] labs include:            9/1/21. Initial diabetes evaluation with me at [place]  Reviewed health history and diabetes issues  Taking metformin 500 mg BID  Advised change to JanumetXR 100/1000 every day, then CDE eval and diagnostic Felecia  ~1/2023. Med change to Jardiance per PCP Dr. Parry, but significant symptoms after 1st dose and stopped it  Subsequent change from JanumetXR to Jentadueto medication    Current DM medications:  Jentadueto 2.5/1000 as 1-tab in morning and evening  .  Blood glucose (BG) meter One Touch   Tests BID   No meter data available today    Recent DexcomG6 data:          FamHx DM: Mother  Recent [facility] labs include:      Recent [facility] labs include:  Lab Results   Component Value Date    A1C 7.4 (H) 03/07/2023     03/07/2023    POTASSIUM 3.8 03/07/2023    CHLORIDE 104 03/07/2023    CO2 25 03/07/2023    ANIONGAP 10 03/07/2023     (H) 03/07/2023    BUN 12.1 03/07/2023    CR 0.54 03/07/2023    GFRESTIMATED >90 03/07/2023    GFRESTBLACK >90 12/27/2012    ARIEL 9.5 03/07/2023    CPEPT 1.8 06/15/2022    CHOL 188 01/31/2022    TRIG 209 (H) 01/31/2022    HDL 48 (L) 01/31/2022    LDL 98 01/31/2022    NHDL 140 (H) 01/31/2022    UCRR 132 06/15/2022    MICROL 13 06/15/2022    UMALCR 9.85 06/15/2022    TSH 0.59 06/15/2022     Last eye exam 2019, but recent concerns about \"floaters\"  DM Complications: none      Lives in [place], 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 Harshal Parry/Doc for general medicine evaluations.  Also sees Dr." Mague Zavala/Allergist, Dr. Misael Murphy/rheumatology     PMH/PSH:  Past Medical History:   Diagnosis Date     Achilles tendon pain     previous right achilles surgery     Cervical pain (neck)     epidural     Chronic back pain      HTN (hypertension)      Hypertriglyceridemia      Near syncope      Osteoarthritis      Osteopenia      Type 2 diabetes mellitus without complication (H)      Past Surgical History:   Procedure Laterality Date     EXCISE CYST BAKER'S       LAPAROTOMY, TUBAL LIGATION, COMBINED       REMOVE IMPLANT BREAST         Family Hx:  No family history on file.      Social Hx:  Social History     Socioeconomic History     Marital status:      Spouse name: Not on file     Number of children: Not on file     Years of education: Not on file     Highest education level: Not on file   Occupational History     Not on file   Tobacco Use     Smoking status: Never     Smokeless tobacco: Never   Substance and Sexual Activity     Alcohol use: Never     Drug use: Never     Sexual activity: Not on file   Other Topics Concern     Not on file   Social History Narrative     Not on file     Social Determinants of Health     Financial Resource Strain: Not on file   Food Insecurity: Not on file   Transportation Needs: Not on file   Physical Activity: Not on file   Stress: Not on file   Social Connections: Not on file   Intimate Partner Violence: Not on file   Housing Stability: Not on file          MEDICATIONS:  has a current medication list which includes the following prescription(s): cetirizine, dexcom g6 sensor, dexcom g6 transmitter, ibuprofen, jentadueto, and dexcom g6 .    ROS:     ROS: 10 point ROS neg other than the symptoms noted above in the HPI.    GENERAL: mild fatigue, wt stable; denies fevers, chills, night sweats.   HEENT: no dysphagia, odonophagia, diplopia, neck pain  THYROID:  no apparent hyper or hypothyroid symptoms  CV: no chest pain, pressure, palpitations  LUNGS: no SOB, MILLER,  cough, wheezing   ABDOMEN: no diarrhea, constipation, abdominal pain  EXTREMITIES: no rashes, ulcers, edema  NEUROLOGY: no headaches, denies changes in vision, tingling, extremitiy numbness   MSK: aching at right shoulder; denies muscle weakness  SKIN: no rashes or lesions  :  no menses since age 53  PSYCH:  stable mood, no significant anxiety or depression  ENDOCRINE: no heat or cold intolerance    Physical Exam   VS: BP (!) 155/82   Pulse 78   Ht 1.524 m (5')   Wt 51 kg (112 lb 8 oz)   BMI 21.97 kg/m    GENERAL: AXOX3, NAD, short stature, well dressed, answering questions appropriately, appears stated age.  THYROID:  normal gland, no apparent nodules or goiter  HEENT: neck non-tender, no exopthalmous, no proptosis, EOMI  LUNGS: no audible wheeze, cough or visible cyanosis, no visible retractions or increased work of breathing  ABDOMEN: normal size, soft, nondistended  EXTREMITIES: no pedal edema  NEUROLOGY: CN grossly intact, no tremors  MSK: grossly intact  SKIN: darker skin complexion; no rashes, no lesions    LABS:    All pertinent notes, labs, and images personally reviewed by me.     A/P:  Encounter Diagnosis   Name Primary?     Type 2 diabetes mellitus without complication, without long-term current use of insulin (H) Yes       Comments:  Reviewed health history and diabetes issues.  Difficult to assess glycemic control without recent glucose trend data, though hgbA1c level good  Reviewed and interpreted tests that I previously ordered.   Ordered appropriate tests for the endocrinology disease management.    Management options discussed and implemented after shared medical decision making with the patient.  T2DM problem is chronic-stable    Plan:  Discussed general issues with the diabetes diagnosis and management  We discussed the hgbA1c test which reflects previous overall glucose levels or control  Discussed the importance of blood glucose (BG) testing to assess glucose trends  Provided general  overview of the diabetes medication options and medication treatment plan  Reviewed use of the DexcomG6 CGM, use of Clarity sajan data if available    Recommend:  Continue current Jentadueto med use  Avoid the Jardiance med... she didn't tolerate it after PCP Rx  Goal target FBG  mg/dl  Continue use of DexcomG6 CGM   Assess glucose averages or TIR  Plan repeat preappt lab tests in 7/2023   Discussed nearby [facility] clinic option   Lab orders placed  Keep focus on diet, exercise, weight management.  Advise having fasting lipid panel testing and dilated eye examination, at least annually    Addressed patient questions today    There are no Patient Instructions on file for this visit.    Future labs ordered today:   Orders Placed This Encounter   Procedures     GLUCOSE MONITOR, 72 HOUR, PHYS INTERP     Hemoglobin A1c     Basic metabolic panel     ALT     Lipid panel reflex to direct LDL Fasting     Radiology/Consults ordered today: None    Total time spent on day of encounter:  25 min    Follow-up:  7/2023, Return    THERESA España MD, MS  Endocrinology  [facility]

## 2023-03-09 NOTE — LETTER
3/9/2023         RE: Darcy Story  [address] [facility] [address]        Dear Colleague,    Thank you for referring your patient, Darcy Story, to the [facility] [place]. Please see a copy of my visit note below.      Recent issues:   Diabetes follow-up evaluation.  States her new PCP Dr. Parry switched her to Jardiance, but significant symptoms after 1st dose and stopped it  Switched from JanumetXR to Jentadueto medications in early 2/2023, per plan  Feeling well overall, no diarrhea concerns and no nocturia noted         2017. Diagnosis of diabetes mellitus  Medical evaluations with Cedric TOM/Doc  Initial treatment with metformin mediation  Previous [facility] labs include:            9/1/21. Initial diabetes evaluation with me at [place]  Reviewed health history and diabetes issues  Taking metformin 500 mg BID  Advised change to JanumetXR 100/1000 every day, then CDE eval and diagnostic Felecia  ~1/2023. Med change to Jardiance per PCP Dr. Parry, but significant symptoms after 1st dose and stopped it  Subsequent change from JanumetXR to Jentadueto medication    Current DM medications:  Jentadueto 2.5/1000 as 1-tab in morning and evening  .  Blood glucose (BG) meter One Touch   Tests BID   No meter data available today    Recent DexcomG6 data:          FamHx DM: Mother  Recent [facility] labs include:      Recent [facility] labs include:  Lab Results   Component Value Date    A1C 7.4 (H) 03/07/2023     03/07/2023    POTASSIUM 3.8 03/07/2023    CHLORIDE 104 03/07/2023    CO2 25 03/07/2023    ANIONGAP 10 03/07/2023     (H) 03/07/2023    BUN 12.1 03/07/2023    CR 0.54 03/07/2023    GFRESTIMATED >90 03/07/2023    GFRESTBLACK >90 12/27/2012    ARIEL 9.5 03/07/2023    CPEPT 1.8 06/15/2022    CHOL 188 01/31/2022    TRIG 209 (H) 01/31/2022    HDL 48 (L) 01/31/2022    LDL 98 01/31/2022    NHDL 140 (H) 01/31/2022    UCRR 132 06/15/2022    MICROL 13 06/15/2022     "UMALCR 9.85 06/15/2022    TSH 0.59 06/15/2022     Last eye exam 2019, but recent concerns about \"floaters\"  DM Complications: none      Lives in [place], [facility]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 Harshal Parry/Doc for general medicine evaluations.  Also sees Dr. Mague Zavala/Allergist, Dr. Misael Murphy/rheumatology     PMH/PSH:  Past Medical History:   Diagnosis Date     Achilles tendon pain     previous right achilles surgery     Cervical pain (neck)     epidural     Chronic back pain      HTN (hypertension)      Hypertriglyceridemia      Near syncope      Osteoarthritis      Osteopenia      Type 2 diabetes mellitus without complication (H)      Past Surgical History:   Procedure Laterality Date     EXCISE CYST BAKER'S       LAPAROTOMY, TUBAL LIGATION, COMBINED       REMOVE IMPLANT BREAST         Family Hx:  No family history on file.      Social Hx:  Social History     Socioeconomic History     Marital status:      Spouse name: Not on file     Number of children: Not on file     Years of education: Not on file     Highest education level: Not on file   Occupational History     Not on file   Tobacco Use     Smoking status: Never     Smokeless tobacco: Never   Substance and Sexual Activity     Alcohol use: Never     Drug use: Never     Sexual activity: Not on file   Other Topics Concern     Not on file   Social History Narrative     Not on file     Social Determinants of Health     Financial Resource Strain: Not on file   Food Insecurity: Not on file   Transportation Needs: Not on file   Physical Activity: Not on file   Stress: Not on file   Social Connections: Not on file   Intimate Partner Violence: Not on file   Housing Stability: Not on file          MEDICATIONS:  has a current medication list which includes the following prescription(s): cetirizine, dexcom g6 sensor, dexcom g6 transmitter, ibuprofen, jentadueto, and dexcom g6 .    ROS:     ROS: 10 point ROS neg other than the symptoms " noted above in the HPI.    GENERAL: mild fatigue, wt stable; denies fevers, chills, night sweats.   HEENT: no dysphagia, odonophagia, diplopia, neck pain  THYROID:  no apparent hyper or hypothyroid symptoms  CV: no chest pain, pressure, palpitations  LUNGS: no SOB, MILLER, cough, wheezing   ABDOMEN: no diarrhea, constipation, abdominal pain  EXTREMITIES: no rashes, ulcers, edema  NEUROLOGY: no headaches, denies changes in vision, tingling, extremitiy numbness   MSK: aching at right shoulder; denies muscle weakness  SKIN: no rashes or lesions  :  no menses since age 53  PSYCH:  stable mood, no significant anxiety or depression  ENDOCRINE: no heat or cold intolerance    Physical Exam   VS: BP (!) 155/82   Pulse 78   Ht 1.524 m (5')   Wt 51 kg (112 lb 8 oz)   BMI 21.97 kg/m    GENERAL: AXOX3, NAD, short stature, well dressed, answering questions appropriately, appears stated age.  THYROID:  normal gland, no apparent nodules or goiter  HEENT: neck non-tender, no exopthalmous, no proptosis, EOMI  LUNGS: no audible wheeze, cough or visible cyanosis, no visible retractions or increased work of breathing  ABDOMEN: normal size, soft, nondistended  EXTREMITIES: no pedal edema  NEUROLOGY: CN grossly intact, no tremors  MSK: grossly intact  SKIN: darker skin complexion; no rashes, no lesions    LABS:    All pertinent notes, labs, and images personally reviewed by me.     A/P:  Encounter Diagnosis   Name Primary?     Type 2 diabetes mellitus without complication, without long-term current use of insulin (H) Yes       Comments:  Reviewed health history and diabetes issues.  Difficult to assess glycemic control without recent glucose trend data, though hgbA1c level good  Reviewed and interpreted tests that I previously ordered.   Ordered appropriate tests for the endocrinology disease management.    Management options discussed and implemented after shared medical decision making with the patient.  T2DM problem is  chronic-stable    Plan:  Discussed general issues with the diabetes diagnosis and management  We discussed the hgbA1c test which reflects previous overall glucose levels or control  Discussed the importance of blood glucose (BG) testing to assess glucose trends  Provided general overview of the diabetes medication options and medication treatment plan  Reviewed use of the DexcomG6 CGM, use of Clarity sajan data if available    Recommend:  Continue current Jentadueto med use  Avoid the Jardiance med... she didn't tolerate it after PCP Rx  Goal target FBG  mg/dl  Continue use of DexcomG6 CGM   Assess glucose averages or TIR  Plan repeat preappt lab tests in 7/2023   Discussed nearby [facility] clinic option   Lab orders placed  Keep focus on diet, exercise, weight management.  Advise having fasting lipid panel testing and dilated eye examination, at least annually    Addressed patient questions today    There are no Patient Instructions on file for this visit.    Future labs ordered today:   Orders Placed This Encounter   Procedures     GLUCOSE MONITOR, 72 HOUR, PHYS INTERP     Hemoglobin A1c     Basic metabolic panel     ALT     Lipid panel reflex to direct LDL Fasting     Radiology/Consults ordered today: None    Total time spent on day of encounter:  25 min    Follow-up:  7/2023, Return    THERESA España MD, MS  Endocrinology  [facility]                Again, thank you for allowing me to participate in the care of your patient.        Sincerely,        Constantino España MD

## 2023-04-12 ENCOUNTER — OFFICE VISIT (OUTPATIENT)
Dept: FAMILY MEDICINE | Facility: CLINIC | Age: 63
End: 2023-04-12
Payer: COMMERCIAL

## 2023-04-12 VITALS
RESPIRATION RATE: 16 BRPM | WEIGHT: 110.1 LBS | BODY MASS INDEX: 21.62 KG/M2 | SYSTOLIC BLOOD PRESSURE: 140 MMHG | TEMPERATURE: 98.2 F | OXYGEN SATURATION: 99 % | HEIGHT: 60 IN | DIASTOLIC BLOOD PRESSURE: 80 MMHG | HEART RATE: 74 BPM

## 2023-04-12 DIAGNOSIS — Z01.818 PRE-OPERATIVE EXAMINATION: Primary | ICD-10-CM

## 2023-04-12 DIAGNOSIS — R03.0 ELEVATED BLOOD PRESSURE READING WITHOUT DIAGNOSIS OF HYPERTENSION: ICD-10-CM

## 2023-04-12 DIAGNOSIS — E11.9 TYPE 2 DIABETES MELLITUS WITHOUT COMPLICATION, WITHOUT LONG-TERM CURRENT USE OF INSULIN (H): ICD-10-CM

## 2023-04-12 PROCEDURE — 99204 OFFICE O/P NEW MOD 45 MIN: CPT | Performed by: INTERNAL MEDICINE

## 2023-04-12 ASSESSMENT — PAIN SCALES - GENERAL: PAINLEVEL: NO PAIN (0)

## 2023-04-12 NOTE — PROGRESS NOTES
[address], [address]  [facility] [address]  Phone: [phone]  Primary Provider: Harshal Parry  Pre-op Performing Provider: OPAL KRAUSE      PREOPERATIVE EVALUATION:  Today's date: 4/12/2023    Darcy Story is a 62 year old female who presents for a preoperative evaluation.       View : No data to display.              Surgical Information:  Surgery/Procedure: Right rotator cuff repair  Surgery Location: [facility]  Surgeon: Dr. Todd  Surgery Date: 04/27/23  Time of Surgery: TBD  Where patient plans to recover: At home with family  Fax number for surgical facility: [phone]    Assessment & Plan     The proposed surgical procedure is considered INTERMEDIATE risk.    Pre-operative examination  Type 2 diabetes mellitus without complication, without long-term current use of insulin (H)  Elevated blood pressure reading without diagnosis of hypertension  Diabetes well controlled under management of endo  Borderline BP. Recommend home monitoring and follow-up in three months. Consider ACEi or ARB given diabetic status if remains elevated on home readings. She is agreeable. Reasonable to proceed with surgery at this time without absolute contraindications. Recent labs reviewed and stable.   She will hold Jentadueto morning of surgery and hold NSAIDS 7 days prior to surgery including morning of surgery    Lab Results   Component Value Date    A1C 7.4 03/07/2023    A1C 6.6 09/07/2022    A1C 7.1 06/15/2022    A1C 6.7 01/31/2022    A1C 7.3 09/01/2021         RECOMMENDATION:  APPROVAL GIVEN to proceed with proposed procedure, without further diagnostic evaluation.        Subjective     HPI related to upcoming procedure:   New patient, desires to establish care  Former PCP: [facility]  Specialists: endo, gyn (clinic [facility]-last pap 6 months ago), allergist    No hx of MI/CVA/VTE.   No cp/sob with rest or exertion.   Exercises: bike every morning, yoga twice a  week  No f/c.  No dysuria  Follows endo for diabetes-hx of jardiance intolerance, doing well on Jentadueto        4/12/2023     8:37 AM   Preop Questions   1. Have you ever had a heart attack or stroke? No   2. Have you ever had surgery on your heart or blood vessels, such as a stent placement, a coronary artery bypass, or surgery on an artery in your head, neck, heart, or legs? No   3. Do you have chest pain with activity? No   4. Do you have a history of  heart failure? No   5. Do you currently have a cold, bronchitis or symptoms of other infection? No   6. Do you have a cough, shortness of breath, or wheezing? No   7. Do you or anyone in your family have previous history of blood clots? No   8. Do you or does anyone in your family have a serious bleeding problem such as prolonged bleeding following surgeries or cuts? No   9. Have you ever had problems with anemia or been told to take iron pills? No   10. Have you had any abnormal blood loss such as black, tarry or bloody stools, or abnormal vaginal bleeding? No   11. Have you ever had a blood transfusion? No   12. Are you willing to have a blood transfusion if it is medically needed before, during, or after your surgery? Yes   13. Have you or any of your relatives ever had problems with anesthesia? No   14. Do you have sleep apnea, excessive snoring or daytime drowsiness? No   15. Do you have any artifical heart valves or other implanted medical devices like a pacemaker, defibrillator, or continuous glucose monitor? YES    15a. What type of device do you have? glucose sensor   15b. Name of the clinic that manages your device:  [facility]   16. Do you have artificial joints? No   17. Are you allergic to latex? No       Health Care Directive:  Patient does not have a Health Care Directive or Living Will: Discussed advance care planning with patient; however, patient declined at this time.      Review of Systems  10 point ROS of systems including Constitutional,  Eyes, Respiratory, Cardiovascular, Gastroenterology, Genitourinary, Integumentary, Muscularskeletal, Psychiatric were all negative except for pertinent positives noted in my HPI.      Patient Active Problem List    Diagnosis Date Noted     Elevated blood pressure reading without diagnosis of hypertension 04/12/2023     Priority: Medium     Osteopenia      Priority: Medium     Type 2 diabetes mellitus without complication (H)      Priority: Medium     iamLUMBAGO 10/05/2005     Priority: Medium      Past Medical History:   Diagnosis Date     Achilles tendon pain     previous right achilles surgery     Cervical pain (neck)     epidural     Chronic back pain      HTN (hypertension)      Hypertriglyceridemia      Near syncope      Osteoarthritis      Osteopenia      Type 2 diabetes mellitus without complication (H)      Past Surgical History:   Procedure Laterality Date     EXCISE CYST BAKER'S       LAPAROTOMY, TUBAL LIGATION, COMBINED       REMOVE IMPLANT BREAST       Current Outpatient Medications   Medication Sig Dispense Refill     Continuous Blood Gluc  (DEXCOM G6 ) LINDA Use to read blood sugars as per 's instructions. 1 each 0     Continuous Blood Gluc Sensor (DEXCOM G6 SENSOR) MISC CHANGE EVERY 10 DAYS 3 each 3     Continuous Blood Gluc Transmit (DEXCOM G6 TRANSMITTER) MISC CHANGE EVERY 3 MONTHS 1 each 1     Ibuprofen (ADVIL PO)        linagliptin-metFORMIN (JENTADUETO) 2.5-1000 MG per tablet Take 1 tablet by mouth 2 times daily (with meals) 180 tablet 1       Allergies   Allergen Reactions     Sulfa Drugs Anaphylaxis        Social History     Tobacco Use     Smoking status: Never     Smokeless tobacco: Never   Vaping Use     Vaping status: Not on file   Substance Use Topics     Alcohol use: Never       History   Drug Use Unknown         Objective     BP (!) 140/80 (BP Location: Right arm, Patient Position: Sitting, Cuff Size: Adult Regular)   Pulse 74   Temp 98.2  F (36.8  C)   Resp  16   Ht 1.524 m (5')   Wt 49.9 kg (110 lb 1.6 oz)   SpO2 99%   BMI 21.50 kg/m      Physical Exam    GENERAL APPEARANCE: AAOx3, no distress. Well developed.    RESP: Lungs CTA bilaterally. No w/r/r. No distress     CV: RRR, S1/S2 present. No m/r/c.     ABDOMEN:  soft, nontender, no distention. No rebound or guarding.     EXT: No c/c/e in lower extremities b/l. No rashes or deformities noted.    PSYCH: appropriate mood and affect.           Recent Labs   Lab Test 03/07/23  0735 09/07/22  0729    139   POTASSIUM 3.8 4.4   CR 0.54 0.50*   A1C 7.4* 6.6*        Diagnostics:  No labs were ordered during this visit.   No EKG required, no history of coronary heart disease, significant arrhythmia, peripheral arterial disease or other structural heart disease.    Revised Cardiac Risk Index (RCRI):  The patient has the following serious cardiovascular risks for perioperative complications:   - No serious cardiac risks = 0 points     RCRI Interpretation: 0 points: Class I (very low risk - 0.4% complication rate)           Signed Electronically by: Kelsy Rodas DO  Copy of this evaluation report is provided to requesting physician.

## 2023-04-12 NOTE — Clinical Note
Please abstract the following data from this visit with this patient into the appropriate field in Epic:  Tests that can be patient reported without a hard copy:  Pap smear done on this date: 06/04/2021 (approximately), by this group: [facility], results were normal, follow up every 5 years   Other Tests found in the patient's chart through Chart Review/Care Everywhere:    Note to Abstraction: If this section is blank, no results were found via Chart Review/Care Everywhere.

## 2023-04-12 NOTE — Clinical Note
Please abstract the following data from this visit with this patient into the appropriate field in Epic:  Tests that can be patient reported without a hard copy:  Eye exam with ophthalmology on this date: 02/08/23 Exam Location: [facility]  Other Tests found in the patient's chart through Chart Review/Care Everywhere:    Note to Abstraction: If this section is blank, no results were found via Chart Review/Care Everywhere.

## 2023-05-09 ENCOUNTER — LAB (OUTPATIENT)
Dept: LAB | Facility: CLINIC | Age: 63
End: 2023-05-09
Payer: COMMERCIAL

## 2023-05-09 DIAGNOSIS — E11.9 TYPE 2 DIABETES MELLITUS WITHOUT COMPLICATION, WITHOUT LONG-TERM CURRENT USE OF INSULIN (H): ICD-10-CM

## 2023-05-09 LAB
ALT SERPL W P-5'-P-CCNC: 13 U/L (ref 10–35)
ANION GAP SERPL CALCULATED.3IONS-SCNC: 10 MMOL/L (ref 7–15)
BUN SERPL-MCNC: 17.1 MG/DL (ref 8–23)
CALCIUM SERPL-MCNC: 9.1 MG/DL (ref 8.8–10.2)
CHLORIDE SERPL-SCNC: 100 MMOL/L (ref 98–107)
CHOLEST SERPL-MCNC: 192 MG/DL
CREAT SERPL-MCNC: 0.61 MG/DL (ref 0.51–0.95)
DEPRECATED HCO3 PLAS-SCNC: 25 MMOL/L (ref 22–29)
GFR SERPL CREATININE-BSD FRML MDRD: >90 ML/MIN/1.73M2
GLUCOSE SERPL-MCNC: 186 MG/DL (ref 70–99)
HBA1C MFR BLD: 7.3 % (ref 0–5.6)
HDLC SERPL-MCNC: 51 MG/DL
LDLC SERPL CALC-MCNC: 98 MG/DL
NONHDLC SERPL-MCNC: 141 MG/DL
POTASSIUM SERPL-SCNC: 4.2 MMOL/L (ref 3.4–5.3)
SODIUM SERPL-SCNC: 135 MMOL/L (ref 136–145)
TRIGL SERPL-MCNC: 213 MG/DL

## 2023-05-09 PROCEDURE — 83036 HEMOGLOBIN GLYCOSYLATED A1C: CPT

## 2023-05-09 PROCEDURE — 80048 BASIC METABOLIC PNL TOTAL CA: CPT

## 2023-05-09 PROCEDURE — 84460 ALANINE AMINO (ALT) (SGPT): CPT

## 2023-05-09 PROCEDURE — 36415 COLL VENOUS BLD VENIPUNCTURE: CPT

## 2023-05-09 PROCEDURE — 80061 LIPID PANEL: CPT

## 2023-07-06 ENCOUNTER — OFFICE VISIT (OUTPATIENT)
Dept: ENDOCRINOLOGY | Facility: CLINIC | Age: 63
End: 2023-07-06
Payer: COMMERCIAL

## 2023-07-06 VITALS
SYSTOLIC BLOOD PRESSURE: 149 MMHG | HEIGHT: 60 IN | HEART RATE: 77 BPM | DIASTOLIC BLOOD PRESSURE: 83 MMHG | WEIGHT: 113.7 LBS | BODY MASS INDEX: 22.32 KG/M2

## 2023-07-06 DIAGNOSIS — E11.9 TYPE 2 DIABETES MELLITUS WITHOUT COMPLICATION, WITHOUT LONG-TERM CURRENT USE OF INSULIN (H): Primary | ICD-10-CM

## 2023-07-06 PROCEDURE — 95251 CONT GLUC MNTR ANALYSIS I&R: CPT | Performed by: INTERNAL MEDICINE

## 2023-07-06 PROCEDURE — 99214 OFFICE O/P EST MOD 30 MIN: CPT | Performed by: INTERNAL MEDICINE

## 2023-07-06 RX ORDER — ACYCLOVIR 400 MG/1
1 TABLET ORAL CONTINUOUS PRN
Qty: 9 EACH | Refills: 1 | Status: SHIPPED | OUTPATIENT
Start: 2023-07-06 | End: 2023-10-04

## 2023-07-06 RX ORDER — GLIMEPIRIDE 2 MG/1
TABLET ORAL
Qty: 30 TABLET | Refills: 5 | Status: SHIPPED | OUTPATIENT
Start: 2023-07-06 | End: 2023-10-25

## 2023-07-06 NOTE — LETTER
7/6/2023         RE: Darcy Story  [address] [facility] [address]        Dear Colleague,    Thank you for referring your patient, Darcy Story, to the [facility] [place]. Please see a copy of my visit note below.      Recent issues:   Diabetes follow-up evaluation.  Continues to take the Jentadueto medication, tolerated well without GI symptoms  Had right shoulder rotator cuff surgery 5/2023 with Dr. Todd, went well  Recent Dexcom CGM trends elevated, especially postmeal        2017. Diagnosis of diabetes mellitus  Medical evaluations with Cedric TOM/Doc  Initial treatment with metformin mediation    9/1/21. Initial diabetes evaluation with me at [place]  Reviewed health history and diabetes issues  Taking metformin 500 mg BID  Advised change to JanumetXR 100/1000 every day, then CDE eval and diagnostic Felecia  1/2023. Med change to Jardiance per PCP Dr. Parry, but significant symptoms after 1st dose and stopped it  Subsequent change from JanumetXR to Jentadueto medication    Current DM medications:  Jentadueto 2.5/1000 as 1-tab in morning and evening  .  Blood glucose (BG) meter One Touch   Previously testing BID    Recent DexcomG6 data:                FamHx DM: Mother  Previous [facility] labs include:        Recent [facility] labs include:  Lab Results   Component Value Date    A1C 7.3 (H) 05/09/2023     (L) 05/09/2023    POTASSIUM 4.2 05/09/2023    CHLORIDE 100 05/09/2023    CO2 25 05/09/2023    ANIONGAP 10 05/09/2023     (H) 05/09/2023    BUN 17.1 05/09/2023    CR 0.61 05/09/2023    GFRESTIMATED >90 05/09/2023    GFRESTBLACK >90 12/27/2012    ARIEL 9.1 05/09/2023    CPEPT 1.8 06/15/2022    CHOL 192 05/09/2023    TRIG 213 (H) 05/09/2023    HDL 51 05/09/2023    LDL 98 05/09/2023    NHDL 141 (H) 05/09/2023    UCRR 132 06/15/2022    MICROL 13 06/15/2022    UMALCR 9.85 06/15/2022    TSH 0.59 06/15/2022     Last eye exam 2019, but recent concerns about  "\"floaters\"  DM Complications: none      Lives in [place], 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 Harshal Parry/Doc for general medicine evaluations.  Also sees Dr. Mague Zavala/Allergist, Dr. Misael Murphy/rheumatology     PMH/PSH:  Past Medical History:   Diagnosis Date     Achilles tendon pain     previous right achilles surgery     Cervical pain (neck)     epidural     Chronic back pain      HTN (hypertension)      Hypertriglyceridemia      Near syncope      Osteoarthritis      Osteopenia      Type 2 diabetes mellitus without complication (H)      Past Surgical History:   Procedure Laterality Date     EXCISE CYST BAKER'S       LAPAROTOMY, TUBAL LIGATION, COMBINED       REMOVE IMPLANT BREAST         Family Hx:  No family history on file.      Social Hx:  Social History     Socioeconomic History     Marital status:      Spouse name: Not on file     Number of children: Not on file     Years of education: Not on file     Highest education level: Not on file   Occupational History     Not on file   Tobacco Use     Smoking status: Never     Smokeless tobacco: Never   Substance and Sexual Activity     Alcohol use: Never     Drug use: Never     Sexual activity: Not on file   Other Topics Concern     Not on file   Social History Narrative     Not on file     Social Determinants of Health     Financial Resource Strain: Not on file   Food Insecurity: Not on file   Transportation Needs: Not on file   Physical Activity: Not on file   Stress: Not on file   Social Connections: Not on file   Intimate Partner Violence: Not on file   Housing Stability: Not on file          MEDICATIONS:  has a current medication list which includes the following prescription(s): dexcom g6 sensor, dexcom g7 sensor, dexcom g6 transmitter, glimepiride, ibuprofen, jentadueto, and dexcom g6 .    ROS:     ROS: 10 point ROS neg other than the symptoms noted above in the HPI.    GENERAL: mild fatigue, wt stable; denies fevers, " chills, night sweats.   HEENT: no dysphagia, odonophagia, diplopia, neck pain  THYROID:  no apparent hyper or hypothyroid symptoms  CV: no chest pain, pressure, palpitations  LUNGS: no SOB, MILLER, cough, wheezing   ABDOMEN: no diarrhea, constipation, abdominal pain  EXTREMITIES: no rashes, ulcers, edema  NEUROLOGY: no headaches, denies changes in vision, tingling, extremitiy numbness   MSK: less right shoulder pain with ROM; denies muscle weakness  SKIN: no rashes or lesions  :  no menses since age 53  PSYCH:  stable mood, no significant anxiety or depression  ENDOCRINE: no heat or cold intolerance    Physical Exam   VS: BP (!) 149/83   Pulse 77   Ht 1.524 m (5')   Wt 51.6 kg (113 lb 11.2 oz)   BMI 22.21 kg/m    GENERAL: AXOX3, NAD, short stature, well dressed, answering questions appropriately, appears stated age.  THYROID:  normal gland, no apparent nodules or goiter  HEENT: neck non-tender, no exopthalmous, no proptosis, EOMI  LUNGS: no audible wheeze, cough or visible cyanosis, no visible retractions or increased work of breathing  ABDOMEN: normal size, soft, nondistended  EXTREMITIES: no pedal edema  NEUROLOGY: CN grossly intact, no tremors  MSK: grossly intact  SKIN: darker skin complexion; no rashes, no lesions    LABS:    All pertinent notes, labs, and images personally reviewed by me.     A/P:  Encounter Diagnosis   Name Primary?     Type 2 diabetes mellitus without complication, without long-term current use of insulin (H) Yes       Comments:  Reviewed health history and diabetes issues.  Recent CGM glucose trends elevated, especially postmeal  Reviewed and interpreted tests that I previously ordered.   Ordered appropriate tests for the endocrinology disease management.    Management options discussed and implemented after shared medical decision making with the patient.  Diabetes problem is chronic with exacerbation progression, hyperglycemia    Plan:  Discussed general issues with the diabetes  diagnosis and management  We discussed the hgbA1c test which reflects previous overall glucose levels or control  Discussed the importance of blood glucose (BG) testing to assess glucose trends  Provided general overview of the diabetes medication options and medication treatment plan  Reviewed use of the DexcomG6 CGM, use of Clarity sajan data if available    Recommend:  Continue current Jentadueto med use  Add glimeparide 2 mg as 1/2-tab daily, new Rx sent to pharmacy  Monitor for symptom changes... distant history of sulfa med side effects but mild lip discoloration  Avoid the Jardiance med... she didn't tolerate it after PCP Rx  Reviewed future med options using daily vs weekly GLP1RA medication, stopping DPP4-I med use  Encouraged patient to download the Dexcom Clarity appt to her phone  Goal target FBG  mg/dl  Continue use of Dexcom CGM   Discussed the G7 option... new Rx sent to pharmacy   Assess glucose averages or TIR   Contact me if q  Plan repeat preappt lab tests in 10/2023   Discussed nearby [facility] clinic option   Lab orders placed  Keep focus on diet, exercise, weight management.  Advise having fasting lipid panel testing and dilated eye examination, at least annually    Addressed patient questions today    There are no Patient Instructions on file for this visit.    Future labs ordered today:   Orders Placed This Encounter   Procedures     GLUCOSE MONITOR, 72 HOUR, PHYS INTERP     Hemoglobin A1c     Basic metabolic panel     ALT     Albumin Random Urine Quantitative with Creat Ratio     Radiology/Consults ordered today: None    Total time spent on day of encounter:  35 min    Follow-up:  10/25/23 at 12 pm, Return    THERESA España MD, MS  Endocrinology  [facility]    CC:  JAIMIE Rodas                  Again, thank you for allowing me to participate in the care of your patient.        Sincerely,        Constantino España MD

## 2023-07-06 NOTE — PROGRESS NOTES
"  Recent issues:   Diabetes follow-up evaluation.  Continues to take the Jentadueto medication, tolerated well without GI symptoms  Had right shoulder rotator cuff surgery 5/2023 with Dr. Todd, went well  Recent Dexcom CGM trends elevated, especially postmeal        2017. Diagnosis of diabetes mellitus  Medical evaluations with Cedric TOM/Doc  Initial treatment with metformin mediation    9/1/21. Initial diabetes evaluation with me at [place]  Reviewed health history and diabetes issues  Taking metformin 500 mg BID  Advised change to JanumetXR 100/1000 every day, then CDE eval and diagnostic Felecia  1/2023. Med change to Jardiance per PCP Dr. Parry, but significant symptoms after 1st dose and stopped it  Subsequent change from JanumetXR to Jentadueto medication    Current DM medications:  Jentadueto 2.5/1000 as 1-tab in morning and evening  .  Blood glucose (BG) meter One Touch   Previously testing BID    Recent DexcomG6 data:                FamHx DM: Mother  Previous [facility] labs include:        Recent [facility] labs include:  Lab Results   Component Value Date    A1C 7.3 (H) 05/09/2023     (L) 05/09/2023    POTASSIUM 4.2 05/09/2023    CHLORIDE 100 05/09/2023    CO2 25 05/09/2023    ANIONGAP 10 05/09/2023     (H) 05/09/2023    BUN 17.1 05/09/2023    CR 0.61 05/09/2023    GFRESTIMATED >90 05/09/2023    GFRESTBLACK >90 12/27/2012    ARIEL 9.1 05/09/2023    CPEPT 1.8 06/15/2022    CHOL 192 05/09/2023    TRIG 213 (H) 05/09/2023    HDL 51 05/09/2023    LDL 98 05/09/2023    NHDL 141 (H) 05/09/2023    UCRR 132 06/15/2022    MICROL 13 06/15/2022    UMALCR 9.85 06/15/2022    TSH 0.59 06/15/2022     Last eye exam 2019, but recent concerns about \"floaters\"  DM Complications: none      Lives in [place], 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 Harshal Parry/Doc for general medicine evaluations.  Also sees Dr. Mague Zavala/Allergist, Dr. Misael Murphy/rheumatology     PMH/PSH:  Past Medical History: "   Diagnosis Date     Achilles tendon pain     previous right achilles surgery     Cervical pain (neck)     epidural     Chronic back pain      HTN (hypertension)      Hypertriglyceridemia      Near syncope      Osteoarthritis      Osteopenia      Type 2 diabetes mellitus without complication (H)      Past Surgical History:   Procedure Laterality Date     EXCISE CYST BAKER'S       LAPAROTOMY, TUBAL LIGATION, COMBINED       REMOVE IMPLANT BREAST         Family Hx:  No family history on file.      Social Hx:  Social History     Socioeconomic History     Marital status:      Spouse name: Not on file     Number of children: Not on file     Years of education: Not on file     Highest education level: Not on file   Occupational History     Not on file   Tobacco Use     Smoking status: Never     Smokeless tobacco: Never   Substance and Sexual Activity     Alcohol use: Never     Drug use: Never     Sexual activity: Not on file   Other Topics Concern     Not on file   Social History Narrative     Not on file     Social Determinants of Health     Financial Resource Strain: Not on file   Food Insecurity: Not on file   Transportation Needs: Not on file   Physical Activity: Not on file   Stress: Not on file   Social Connections: Not on file   Intimate Partner Violence: Not on file   Housing Stability: Not on file          MEDICATIONS:  has a current medication list which includes the following prescription(s): dexcom g6 sensor, dexcom g7 sensor, dexcom g6 transmitter, glimepiride, ibuprofen, jentadueto, and dexcom g6 .    ROS:     ROS: 10 point ROS neg other than the symptoms noted above in the HPI.    GENERAL: mild fatigue, wt stable; denies fevers, chills, night sweats.   HEENT: no dysphagia, odonophagia, diplopia, neck pain  THYROID:  no apparent hyper or hypothyroid symptoms  CV: no chest pain, pressure, palpitations  LUNGS: no SOB, MILLER, cough, wheezing   ABDOMEN: no diarrhea, constipation, abdominal  pain  EXTREMITIES: no rashes, ulcers, edema  NEUROLOGY: no headaches, denies changes in vision, tingling, extremitiy numbness   MSK: less right shoulder pain with ROM; denies muscle weakness  SKIN: no rashes or lesions  :  no menses since age 53  PSYCH:  stable mood, no significant anxiety or depression  ENDOCRINE: no heat or cold intolerance    Physical Exam   VS: BP (!) 149/83   Pulse 77   Ht 1.524 m (5')   Wt 51.6 kg (113 lb 11.2 oz)   BMI 22.21 kg/m    GENERAL: AXOX3, NAD, short stature, well dressed, answering questions appropriately, appears stated age.  THYROID:  normal gland, no apparent nodules or goiter  HEENT: neck non-tender, no exopthalmous, no proptosis, EOMI  LUNGS: no audible wheeze, cough or visible cyanosis, no visible retractions or increased work of breathing  ABDOMEN: normal size, soft, nondistended  EXTREMITIES: no pedal edema  NEUROLOGY: CN grossly intact, no tremors  MSK: grossly intact  SKIN: darker skin complexion; no rashes, no lesions    LABS:    All pertinent notes, labs, and images personally reviewed by me.     A/P:  Encounter Diagnosis   Name Primary?     Type 2 diabetes mellitus without complication, without long-term current use of insulin (H) Yes       Comments:  Reviewed health history and diabetes issues.  Recent CGM glucose trends elevated, especially postmeal  Reviewed and interpreted tests that I previously ordered.   Ordered appropriate tests for the endocrinology disease management.    Management options discussed and implemented after shared medical decision making with the patient.  Diabetes problem is chronic with exacerbation progression, hyperglycemia    Plan:  Discussed general issues with the diabetes diagnosis and management  We discussed the hgbA1c test which reflects previous overall glucose levels or control  Discussed the importance of blood glucose (BG) testing to assess glucose trends  Provided general overview of the diabetes medication options and  medication treatment plan  Reviewed use of the DexcomG6 CGM, use of Clarity sajan data if available    Recommend:  Continue current Jentadueto med use  Add glimeparide 2 mg as 1/2-tab daily, new Rx sent to pharmacy  Monitor for symptom changes... distant history of sulfa med side effects but mild lip discoloration  Avoid the Jardiance med... she didn't tolerate it after PCP Rx  Reviewed future med options using daily vs weekly GLP1RA medication, stopping DPP4-I med use  Encouraged patient to download the Dexcom Clarity appt to her phone  Goal target FBG  mg/dl  Continue use of Dexcom CGM   Discussed the G7 option... new Rx sent to pharmacy   Assess glucose averages or TIR   Contact me if q  Plan repeat preappt lab tests in 10/2023   Discussed nearby [facility] clinic option   Lab orders placed  Keep focus on diet, exercise, weight management.  Advise having fasting lipid panel testing and dilated eye examination, at least annually    Addressed patient questions today    There are no Patient Instructions on file for this visit.    Future labs ordered today:   Orders Placed This Encounter   Procedures     GLUCOSE MONITOR, 72 HOUR, PHYS INTERP     Hemoglobin A1c     Basic metabolic panel     ALT     Albumin Random Urine Quantitative with Creat Ratio     Radiology/Consults ordered today: None    Total time spent on day of encounter:  35 min    Follow-up:  10/25/23 at 12 pm, Return    THERESA España MD, MS  Endocrinology  [facility]    CC:  JAIMIE Rodas

## 2023-07-10 ENCOUNTER — TELEPHONE (OUTPATIENT)
Dept: ENDOCRINOLOGY | Facility: CLINIC | Age: 63
End: 2023-07-10
Payer: COMMERCIAL

## 2023-07-10 DIAGNOSIS — E11.9 TYPE 2 DIABETES MELLITUS WITHOUT COMPLICATION, WITHOUT LONG-TERM CURRENT USE OF INSULIN (H): ICD-10-CM

## 2023-07-10 RX ORDER — LINAGLIPTIN AND METFORMIN HYDROCHLORIDE 2.5; 1 MG/1; MG/1
TABLET, FILM COATED ORAL
Qty: 180 TABLET | Refills: 1 | Status: SHIPPED | OUTPATIENT
Start: 2023-07-10 | End: 2023-10-25

## 2023-07-10 NOTE — TELEPHONE ENCOUNTER
[facility] Call Center    Phone Message    May a detailed message be left on voicemail: yes     Reason for Call: Other: patient would like a call back to discuss current BS levels.  She states that this moring she tested at 88 with the new medication Rx'd     Action Taken: Other: endo    Travel Screening: Not Applicable

## 2023-07-10 NOTE — TELEPHONE ENCOUNTER
Patient calling with happy news.  She started the glimeparide 2mg 1/2 tablet per day and states her blood sugars have been great!  Running .  She is so happy.      Bruna Rogers RN

## 2023-07-10 NOTE — TELEPHONE ENCOUNTER
"Last Written Prescription Date:  1/30/23  Last Fill Quantity: 180,  # refills: 1   Last office visit: 7/6/2023 with Dr. España  Future Office Visit:  10/25/23        Requested Prescriptions   Pending Prescriptions Disp Refills     JENTADUETO 2.5-1000 MG per tablet [Pharmacy Med Name: JENTADUETO 2.5 MG-1000 MG TAB] 180 tablet 1     Sig: TAKE 1 TABLET BY MOUTH TWICE A DAY WITH MEALS       Combination Oral Antihyperglycemic Agents Passed - 7/10/2023 12:31 AM        Passed - Patient has documented A1c within the specified period of time.     If HgbA1C is 8 or greater, it needs to be on file within the past 3 months.  If less than 8, must be on file within the past 6 months.     Recent Labs   Lab Test 05/09/23  0738   A1C 7.3*             Passed - Patient's CR is NOT>1.4 OR Patient's EGFR is NOT<45 within past 12 mos.     Recent Labs   Lab Test 05/09/23  0738   GFRESTIMATED >90       Recent Labs   Lab Test 05/09/23  0738   CR 0.61             Passed - Patient does not have a diagnosis of CHF.        Passed - Medication is active on med list        Passed - Patient is 18 years old or older.        Passed - Patient is not pregnant        Passed - Patient has not had a positive pregnancy test within the past 12 mos.        Passed - Recent (6 mo) or future (30 days) visit within the authorizing provider's specialty     Patient had office visit in the last 6 months or has a visit in the next 30 days with authorizing provider or within the authorizing provider's specialty.  See \"Patient Info\" tab in inbasket, or \"Choose Columns\" in Meds & Orders section of the refill encounter.               Refills sent  Yvonne Holliday RN    "

## 2023-07-11 NOTE — TELEPHONE ENCOUNTER
Patient BG update message reviewed, appreciated.    THERESA España MD, MS  Endocrinology  [facility]

## 2023-07-18 ENCOUNTER — TELEPHONE (OUTPATIENT)
Dept: ENDOCRINOLOGY | Facility: CLINIC | Age: 63
End: 2023-07-18
Payer: COMMERCIAL

## 2023-07-18 DIAGNOSIS — E11.9 TYPE 2 DIABETES MELLITUS WITHOUT COMPLICATION, WITHOUT LONG-TERM CURRENT USE OF INSULIN (H): Primary | ICD-10-CM

## 2023-07-18 RX ORDER — ACYCLOVIR 400 MG/1
TABLET ORAL
Qty: 1 EACH | Refills: 0 | Status: SHIPPED | OUTPATIENT
Start: 2023-07-18

## 2023-07-18 NOTE — TELEPHONE ENCOUNTER
[facility] Call Center    Phone Message    May a detailed message be left on voicemail: yes     Reason for Call: Medication Question or concern regarding medication   Prescription Clarification  Name of Medication: Continuous Blood Gluc  (DEXCOM G7 )  Prescribing Provider: Dr smart   Pharmacy: [facility] #[id number] [address] [facility]   What on the order needs clarification? Patient states she requires Rx for G7 .          Action Taken: Other: endo    Travel Screening: Not Applicable                                                                       Student

## 2023-07-26 ENCOUNTER — TELEPHONE (OUTPATIENT)
Dept: ENDOCRINOLOGY | Facility: CLINIC | Age: 63
End: 2023-07-26
Payer: COMMERCIAL

## 2023-07-26 NOTE — TELEPHONE ENCOUNTER
[facility] Call Center    Phone Message    May a detailed message be left on voicemail: yes     Reason for Call: Medication Question or concern regarding medication   Prescription Clarification  Name of Medication: glimepiride (AMARYL) 2 MG tablet   Prescribing Provider: Dr. España      What on the order needs clarification? Patient wants to know if her RX can be extended it is working to keep Blood Sugar levels low . She also has questions about if there is a range she should try to stay at to not get too low ?       Action Taken: Other: ENDO    Travel Screening: Not Applicable

## 2023-07-26 NOTE — TELEPHONE ENCOUNTER
Called pt.  States since starting Glimepiride and Jentadueto her blood sugars have been great.  FBS 90-low 100's.  This am it was 72.  Pt wanted to know if that is ok as she couldn't remember the range it should be in. Denied having any symptoms.  Pt knows when/what to eat with low sugars.  Reviewed with pt goal target FBG  mg/dl per provider's last visit notes.  Encouraged pt to double check questionable sugars (especially if asymptomatic) with glucometer.   Pt verbalized understanding of information discussed.  Yvonne Holliday RN

## 2023-07-30 DIAGNOSIS — E11.9 TYPE 2 DIABETES MELLITUS WITHOUT COMPLICATION, WITHOUT LONG-TERM CURRENT USE OF INSULIN (H): ICD-10-CM

## 2023-07-31 RX ORDER — GLIMEPIRIDE 2 MG/1
TABLET ORAL
Qty: 30 TABLET | Refills: 5 | OUTPATIENT
Start: 2023-07-31

## 2023-08-26 ENCOUNTER — HEALTH MAINTENANCE LETTER (OUTPATIENT)
Age: 63
End: 2023-08-26

## 2023-10-19 ENCOUNTER — LAB (OUTPATIENT)
Dept: LAB | Facility: CLINIC | Age: 63
End: 2023-10-19
Payer: COMMERCIAL

## 2023-10-19 DIAGNOSIS — E11.9 TYPE 2 DIABETES MELLITUS WITHOUT COMPLICATION, WITHOUT LONG-TERM CURRENT USE OF INSULIN (H): ICD-10-CM

## 2023-10-19 LAB
ALT SERPL W P-5'-P-CCNC: 17 U/L (ref 0–50)
ANION GAP SERPL CALCULATED.3IONS-SCNC: 10 MMOL/L (ref 7–15)
BUN SERPL-MCNC: 12.9 MG/DL (ref 8–23)
CALCIUM SERPL-MCNC: 9.4 MG/DL (ref 8.8–10.2)
CHLORIDE SERPL-SCNC: 105 MMOL/L (ref 98–107)
CREAT SERPL-MCNC: 0.58 MG/DL (ref 0.51–0.95)
DEPRECATED HCO3 PLAS-SCNC: 26 MMOL/L (ref 22–29)
EGFRCR SERPLBLD CKD-EPI 2021: >90 ML/MIN/1.73M2
GLUCOSE SERPL-MCNC: 156 MG/DL (ref 70–99)
HBA1C MFR BLD: 5.4 % (ref 0–5.6)
POTASSIUM SERPL-SCNC: 3.9 MMOL/L (ref 3.4–5.3)
SODIUM SERPL-SCNC: 141 MMOL/L (ref 135–145)

## 2023-10-19 PROCEDURE — 36415 COLL VENOUS BLD VENIPUNCTURE: CPT

## 2023-10-19 PROCEDURE — 84460 ALANINE AMINO (ALT) (SGPT): CPT

## 2023-10-19 PROCEDURE — 82570 ASSAY OF URINE CREATININE: CPT

## 2023-10-19 PROCEDURE — 83036 HEMOGLOBIN GLYCOSYLATED A1C: CPT

## 2023-10-19 PROCEDURE — 82043 UR ALBUMIN QUANTITATIVE: CPT

## 2023-10-19 PROCEDURE — 80048 BASIC METABOLIC PNL TOTAL CA: CPT

## 2023-10-20 LAB
CREAT UR-MCNC: 148 MG/DL
MICROALBUMIN UR-MCNC: 12 MG/L
MICROALBUMIN/CREAT UR: 8.11 MG/G CR (ref 0–25)

## 2023-10-25 ENCOUNTER — OFFICE VISIT (OUTPATIENT)
Dept: ENDOCRINOLOGY | Facility: CLINIC | Age: 63
End: 2023-10-25
Payer: COMMERCIAL

## 2023-10-25 VITALS — SYSTOLIC BLOOD PRESSURE: 149 MMHG | BODY MASS INDEX: 22.65 KG/M2 | WEIGHT: 116 LBS | DIASTOLIC BLOOD PRESSURE: 83 MMHG

## 2023-10-25 DIAGNOSIS — E11.9 TYPE 2 DIABETES MELLITUS WITHOUT COMPLICATION, WITHOUT LONG-TERM CURRENT USE OF INSULIN (H): Primary | ICD-10-CM

## 2023-10-25 PROCEDURE — 99214 OFFICE O/P EST MOD 30 MIN: CPT | Performed by: INTERNAL MEDICINE

## 2023-10-25 PROCEDURE — 95251 CONT GLUC MNTR ANALYSIS I&R: CPT | Performed by: INTERNAL MEDICINE

## 2023-10-25 RX ORDER — GLIMEPIRIDE 2 MG/1
TABLET ORAL
Qty: 45 TABLET | Refills: 1 | Status: SHIPPED | OUTPATIENT
Start: 2023-10-25 | End: 2024-02-09

## 2023-10-25 RX ORDER — LINAGLIPTIN AND METFORMIN HYDROCHLORIDE 2.5; 1 MG/1; MG/1
1 TABLET, FILM COATED ORAL 2 TIMES DAILY WITH MEALS
Qty: 180 TABLET | Refills: 1 | Status: SHIPPED | OUTPATIENT
Start: 2023-10-25 | End: 2024-04-22

## 2023-10-25 RX ORDER — ACYCLOVIR 400 MG/1
1 TABLET ORAL CONTINUOUS PRN
Qty: 3 EACH | Refills: 5 | Status: SHIPPED | OUTPATIENT
Start: 2023-10-25 | End: 2024-01-23

## 2023-10-25 NOTE — PROGRESS NOTES
"  Recent issues:   Diabetes follow-up evaluation.  Previous right shoulder rotator cuff surgery 5/2023 with Dr. Todd, went well  Taking the Jentadueto and low dose glimeparide meds, per plan  Recent Dexcom CGM trends and hgbA1c much better        2017. Diagnosis of diabetes mellitus  Medical evaluations with Cedric TOM/Doc  Initial treatment with metformin mediation    9/1/21. Initial diabetes evaluation with me at [place]  Reviewed health history and diabetes issues  Taking metformin 500 mg BID  Advised change to JanumetXR 100/1000 every day, then CDE eval and diagnostic Felecia  1/2023. Med change to Jardiance per PCP Dr. Parry, but significant symptoms after 1st dose and stopped it  Subsequent change from JanumetXR to Jentadueto medication    Current DM medications:  Jentadueto 2.5/1000 1-tab in morning and evening  Glimeparide 2 mg 1-tab in morning  .  Blood glucose (BG) meter One Touch   Previously testing BID    2023. Changed from DexcomG6 to G7  Recent DexcomG7 data:        FamHx DM: Mother  Previous [facility] labs include:        Recent [facility] labs include:  Lab Results   Component Value Date    A1C 5.4 10/19/2023     10/19/2023    POTASSIUM 3.9 10/19/2023    CHLORIDE 105 10/19/2023    CO2 26 10/19/2023    ANIONGAP 10 10/19/2023     (H) 10/19/2023    BUN 12.9 10/19/2023    CR 0.58 10/19/2023    GFRESTIMATED >90 10/19/2023    GFRESTBLACK >90 12/27/2012    ARIEL 9.4 10/19/2023    CPEPT 1.8 06/15/2022    CHOL 192 05/09/2023    TRIG 213 (H) 05/09/2023    HDL 51 05/09/2023    LDL 98 05/09/2023    NHDL 141 (H) 05/09/2023    UCRR 148.0 10/19/2023    MICROL 12.0 10/19/2023    UMALCR 8.11 10/19/2023    TSH 0.59 06/15/2022     Last eye exam 2019?, but recent concerns about \"floaters\"  DM Complications: none      Lives in [place], 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 Harshal Parry/Doc for general medicine evaluations.  Also sees Dr. Mague Zavala/Allergist, Dr. Misael Murphy/rheumatology "     PMH/PSH:  Past Medical History:   Diagnosis Date    Achilles tendon pain     previous right achilles surgery    Cervical pain (neck)     epidural    Chronic back pain     HTN (hypertension)     Hypertriglyceridemia     Near syncope     Osteoarthritis     Osteopenia     Type 2 diabetes mellitus without complication (H)      Past Surgical History:   Procedure Laterality Date    EXCISE CYST BAKER'S      LAPAROTOMY, TUBAL LIGATION, COMBINED      REMOVE IMPLANT BREAST         Family Hx:  No family history on file.      Social Hx:  Social History     Socioeconomic History    Marital status:      Spouse name: Not on file    Number of children: Not on file    Years of education: Not on file    Highest education level: Not on file   Occupational History    Not on file   Tobacco Use    Smoking status: Never    Smokeless tobacco: Never   Substance and Sexual Activity    Alcohol use: Never    Drug use: Never    Sexual activity: Not on file   Other Topics Concern    Not on file   Social History Narrative    Not on file     Social Determinants of Health     Financial Resource Strain: Not on file   Food Insecurity: Not on file   Transportation Needs: Not on file   Physical Activity: Not on file   Stress: Not on file   Social Connections: Not on file   Interpersonal Safety: Not on file   Housing Stability: Not on file          MEDICATIONS:  has a current medication list which includes the following prescription(s): dexcom g7 , glimepiride, ibuprofen, jentadueto, dexcom g6 , dexcom g6 sensor, and dexcom g6 transmitter.    ROS:     ROS: 10 point ROS neg other than the symptoms noted above in the HPI.    GENERAL: mild fatigue, wt stable; denies fevers, chills, night sweats.   HEENT: no dysphagia, odonophagia, diplopia, neck pain  THYROID:  no apparent hyper or hypothyroid symptoms  CV: no chest pain, pressure, palpitations  LUNGS: no SOB, MILLER, cough, wheezing   ABDOMEN: no diarrhea, constipation, abdominal  pain  EXTREMITIES: no rashes, ulcers, edema  NEUROLOGY: no headaches, denies changes in vision, tingling, extremitiy numbness   MSK: less right shoulder pain with ROM; denies muscle weakness  SKIN: no rashes or lesions  :  no menses since age 53  PSYCH:  stable mood, no significant anxiety or depression  ENDOCRINE: no heat or cold intolerance    Physical Exam   VS: BP (!) 149/83 (BP Location: Right arm, Patient Position: Sitting, Cuff Size: Adult Regular)   Wt 52.6 kg (116 lb)   BMI 22.65 kg/m    GENERAL: AXOX3, NAD, short stature, well dressed, answering questions appropriately, appears stated age.  THYROID:  normal gland, no apparent nodules or goiter  HEENT: neck non-tender, no exopthalmous, no proptosis, EOMI  LUNGS: no audible wheeze, cough or visible cyanosis, no visible retractions or increased work of breathing  ABDOMEN: normal size, soft, nondistended  EXTREMITIES: no pedal edema  NEUROLOGY: CN grossly intact, no tremors  MSK: grossly intact  SKIN: darker skin complexion; no rashes, no lesions    LABS:    All pertinent notes, labs, and images personally reviewed by me.     A/P:  Encounter Diagnosis   Name Primary?    Type 2 diabetes mellitus without complication, without long-term current use of insulin (H) Yes         Comments:  Reviewed health history and diabetes issues.  Recent CGM glucose trends elevated, especially postmeal  Reviewed and interpreted tests that I previously ordered.   Ordered appropriate tests for the endocrinology disease management.    Management options discussed and implemented after shared medical decision making with the patient.  Diabetes problem is chronic with exacerbation progression, hyperglycemia    Plan:  Discussed general issues with the diabetes diagnosis and management  We discussed the hgbA1c test which reflects previous overall glucose levels or control  Discussed the importance of blood glucose (BG) testing to assess glucose trends  Provided general overview of  the diabetes medication options and medication treatment plan  Reviewed use of the DexcomG6 CGM, use of Clarity sajan data if available    Recommend:  Continue current Jentadueto and glimeparide med use  Lower the glimeparide 2 mg as 1/2-tab daily  Monitor for symptom changes... distant history of sulfa med side effects but mild lip discoloration  Avoid the Jardiance med... she didn't tolerate it after PCP Rx  We have reviewed future med options using daily vs weekly GLP1RA medication, stopping DPP4-I med use  [facility] Rx's to [facility]  Keep focus on diet, exercise, and weight management..  Goal target FBG  mg/dl  Continue use of DexcomG7 CGM   Discussed the G7 option... new Rx sent to pharmacy   Assess glucose averages or TIR   Contact me if questions  Plan nonfasting lab tests in 3/2024   Discussed nearby [facility] clinic option   Lab orders placed  Needs to schedule a follow-up eye exam, discussed some of the area eye clinic options  Advise having fasting lipid panel testing and dilated eye examination, at least annually    Addressed patient questions today    There are no Patient Instructions on file for this visit.    Future labs ordered today:   Orders Placed This Encounter   Procedures    GLUCOSE MONITOR, 72 HOUR, PHYS INTERP    Hemoglobin A1c    Basic metabolic panel    TSH     Radiology/Consults ordered today: None    Total time spent on day of encounter:  16 min    Follow-up:  3/2024    THERESA España MD, MS  Endocrinology  [facility]

## 2023-10-25 NOTE — Clinical Note
10/25/2023         RE: Darcy Story  [address] [facility] [id number]        Dear Colleague,    Thank you for referring your patient, Darcy Story, to the [facility] [place]. Please see a copy of my visit note below.      Recent issues:   Diabetes follow-up evaluation.  Continues to take the Jentadueto medication, tolerated well without GI symptoms  Had right shoulder rotator cuff surgery 5/2023 with Dr. Todd, went well  Recent Dexcom CGM trends elevated, especially postmeal        2017. Diagnosis of diabetes mellitus  Medical evaluations with Cedric TOM/Doc  Initial treatment with metformin mediation    9/1/21. Initial diabetes evaluation with me at [place]  Reviewed health history and diabetes issues  Taking metformin 500 mg BID  Advised change to JanumetXR 100/1000 every day, then CDE eval and diagnostic Felecia  1/2023. Med change to Jardiance per PCP Dr. Parry, but significant symptoms after 1st dose and stopped it  Subsequent change from JanumetXR to Jentadueto medication    Current DM medications:  Jentadueto 2.5/1000 1-tab in morning and evening  Glimeparide 2 mg 1-tab in morning  .  Blood glucose (BG) meter One Touch   Previously testing BID    Recent DexcomG6 data:                      FamHx DM: Mother  Previous [facility] labs include:        Recent [facility] labs include:  Lab Results   Component Value Date    A1C 5.4 10/19/2023     10/19/2023    POTASSIUM 3.9 10/19/2023    CHLORIDE 105 10/19/2023    CO2 26 10/19/2023    ANIONGAP 10 10/19/2023     (H) 10/19/2023    BUN 12.9 10/19/2023    CR 0.58 10/19/2023    GFRESTIMATED >90 10/19/2023    GFRESTBLACK >90 12/27/2012    ARIEL 9.4 10/19/2023    CPEPT 1.8 06/15/2022    CHOL 192 05/09/2023    TRIG 213 (H) 05/09/2023    HDL 51 05/09/2023    LDL 98 05/09/2023    NHDL 141 (H) 05/09/2023    UCRR 148.0 10/19/2023    MICROL 12.0 10/19/2023    UMALCR 8.11 10/19/2023    TSH 0.59 06/15/2022     Last eye  "exam 2019, but recent concerns about \"floaters\"  DM Complications: none      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 Harshal Parry/Doc for general medicine evaluations.  Also sees Dr. Mague Zavala/Allergist, Dr. Misael Murphy/rheumatology     PMH/PSH:  Past Medical History:   Diagnosis Date    Achilles tendon pain     previous right achilles surgery    Cervical pain (neck)     epidural    Chronic back pain     HTN (hypertension)     Hypertriglyceridemia     Near syncope     Osteoarthritis     Osteopenia     Type 2 diabetes mellitus without complication (H)      Past Surgical History:   Procedure Laterality Date    EXCISE CYST BAKER'S      LAPAROTOMY, TUBAL LIGATION, COMBINED      REMOVE IMPLANT BREAST         Family Hx:  No family history on file.      Social Hx:  Social History     Socioeconomic History    Marital status:      Spouse name: Not on file    Number of children: Not on file    Years of education: Not on file    Highest education level: Not on file   Occupational History    Not on file   Tobacco Use    Smoking status: Never    Smokeless tobacco: Never   Substance and Sexual Activity    Alcohol use: Never    Drug use: Never    Sexual activity: Not on file   Other Topics Concern    Not on file   Social History Narrative    Not on file     Social Determinants of Health     Financial Resource Strain: Not on file   Food Insecurity: Not on file   Transportation Needs: Not on file   Physical Activity: Not on file   Stress: Not on file   Social Connections: Not on file   Interpersonal Safety: Not on file   Housing Stability: Not on file          MEDICATIONS:  has a current medication list which includes the following prescription(s): dexcom g6 , dexcom g7 , dexcom g6 sensor, dexcom g6 transmitter, glimepiride, ibuprofen, and jentadueto.    ROS:     ROS: 10 point ROS neg other than the symptoms noted above in the HPI.    GENERAL: mild fatigue, wt stable; denies " fevers, chills, night sweats.   HEENT: no dysphagia, odonophagia, diplopia, neck pain  THYROID:  no apparent hyper or hypothyroid symptoms  CV: no chest pain, pressure, palpitations  LUNGS: no SOB, MILLER, cough, wheezing   ABDOMEN: no diarrhea, constipation, abdominal pain  EXTREMITIES: no rashes, ulcers, edema  NEUROLOGY: no headaches, denies changes in vision, tingling, extremitiy numbness   MSK: less right shoulder pain with ROM; denies muscle weakness  SKIN: no rashes or lesions  :  no menses since age 53  PSYCH:  stable mood, no significant anxiety or depression  ENDOCRINE: no heat or cold intolerance    Physical Exam   VS: There were no vitals taken for this visit.  GENERAL: AXOX3, NAD, short stature, well dressed, answering questions appropriately, appears stated age.  THYROID:  normal gland, no apparent nodules or goiter  HEENT: neck non-tender, no exopthalmous, no proptosis, EOMI  LUNGS: no audible wheeze, cough or visible cyanosis, no visible retractions or increased work of breathing  ABDOMEN: normal size, soft, nondistended  EXTREMITIES: no pedal edema  NEUROLOGY: CN grossly intact, no tremors  MSK: grossly intact  SKIN: darker skin complexion; no rashes, no lesions    LABS:    All pertinent notes, labs, and images personally reviewed by me.     A/P:  No diagnosis found.      Comments:  Reviewed health history and diabetes issues.  Recent CGM glucose trends elevated, especially postmeal  Reviewed and interpreted tests that I previously ordered.   Ordered appropriate tests for the endocrinology disease management.    Management options discussed and implemented after shared medical decision making with the patient.  Diabetes problem is chronic with exacerbation progression, hyperglycemia    Plan:  Discussed general issues with the diabetes diagnosis and management  We discussed the hgbA1c test which reflects previous overall glucose levels or control  Discussed the importance of blood glucose (BG) testing  to assess glucose trends  Provided general overview of the diabetes medication options and medication treatment plan  Reviewed use of the DexcomG6 CGM, use of Clarity sajan data if available    Recommend:  Continue current Jentadueto med use  Add glimeparide 2 mg as 1/2-tab daily, new Rx sent to pharmacy  Monitor for symptom changes... distant history of sulfa med side effects but mild lip discoloration  Avoid the Jardiance med... she didn't tolerate it after PCP Rx  Reviewed future med options using daily vs weekly GLP1RA medication, stopping DPP4-I med use      Needs updated Rx's to [facility]...  Encouraged patient to download the Dexcom Clarity appt to her phone  Goal target FBG  mg/dl  Continue use of Dexcom CGM   Discussed the G7 option... new Rx sent to pharmacy   Assess glucose averages or TIR   Contact me if questions  Plan nonfasting lab tests in 3/2024   Discussed nearby [facility] option   Lab orders placed  Keep focus on diet, exercise, weight management.  Advise having fasting lipid panel testing and dilated eye examination, at least annually    Addressed patient questions today    There are no Patient Instructions on file for this visit.    Future labs ordered today: No orders of the defined types were placed in this encounter.    Radiology/Consults ordered today: None    Total time spent on day of encounter:  ***    Follow-up:  3/2024    THERESA España MD, MS  Endocrinology  [facility]    CC:  JAIMIE Rodas                  Recent issues:   Diabetes follow-up evaluation.  Previous right shoulder rotator cuff surgery 5/2023 with Dr. Todd, went well  Taking the Jentadueto and low dose glimeparide meds, per plan  Recent Dexcom CGM trends and hgbA1c much better        2017. Diagnosis of diabetes mellitus  Medical evaluations with Cedric TOM/Doc  Initial treatment with metformin mediation    9/1/21. Initial diabetes evaluation with me at [place]  Reviewed health history and diabetes  "issues  Taking metformin 500 mg BID  Advised change to JanumetXR 100/1000 every day, then CDE eval and diagnostic Felecia  1/2023. Med change to Jardiance per PCP Dr. Parry, but significant symptoms after 1st dose and stopped it  Subsequent change from JanumetXR to Jentadueto medication    Current DM medications:  Jentadueto 2.5/1000 1-tab in morning and evening  Glimeparide 2 mg 1-tab in morning  .  Blood glucose (BG) meter One Touch   Previously testing BID    2023. Changed from DexcomG6 to G7  Recent DexcomG7 data:        FamHx DM: Mother  Previous [facility] labs include:        Recent [facility] labs include:  Lab Results   Component Value Date    A1C 5.4 10/19/2023     10/19/2023    POTASSIUM 3.9 10/19/2023    CHLORIDE 105 10/19/2023    CO2 26 10/19/2023    ANIONGAP 10 10/19/2023     (H) 10/19/2023    BUN 12.9 10/19/2023    CR 0.58 10/19/2023    GFRESTIMATED >90 10/19/2023    GFRESTBLACK >90 12/27/2012    ARIEL 9.4 10/19/2023    CPEPT 1.8 06/15/2022    CHOL 192 05/09/2023    TRIG 213 (H) 05/09/2023    HDL 51 05/09/2023    LDL 98 05/09/2023    NHDL 141 (H) 05/09/2023    UCRR 148.0 10/19/2023    MICROL 12.0 10/19/2023    UMALCR 8.11 10/19/2023    TSH 0.59 06/15/2022     Last eye exam 2019?, but recent concerns about \"floaters\"  DM Complications: none      Lives in [place], 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 Harshal Parry/Doc for general medicine evaluations.  Also sees Dr. Mague Zavala/Allergist, Dr. Misael Murphy/rheumatology     PMH/PSH:  Past Medical History:   Diagnosis Date     Achilles tendon pain     previous right achilles surgery     Cervical pain (neck)     epidural     Chronic back pain      HTN (hypertension)      Hypertriglyceridemia      Near syncope      Osteoarthritis      Osteopenia      Type 2 diabetes mellitus without complication (H)      Past Surgical History:   Procedure Laterality Date     EXCISE CYST BAKER'S       LAPAROTOMY, TUBAL LIGATION, COMBINED       REMOVE IMPLANT " BREAST         Family Hx:  No family history on file.      Social Hx:  Social History     Socioeconomic History     Marital status:      Spouse name: Not on file     Number of children: Not on file     Years of education: Not on file     Highest education level: Not on file   Occupational History     Not on file   Tobacco Use     Smoking status: Never     Smokeless tobacco: Never   Substance and Sexual Activity     Alcohol use: Never     Drug use: Never     Sexual activity: Not on file   Other Topics Concern     Not on file   Social History Narrative     Not on file     Social Determinants of Health     Financial Resource Strain: Not on file   Food Insecurity: Not on file   Transportation Needs: Not on file   Physical Activity: Not on file   Stress: Not on file   Social Connections: Not on file   Interpersonal Safety: Not on file   Housing Stability: Not on file          MEDICATIONS:  has a current medication list which includes the following prescription(s): dexcom g7 , glimepiride, ibuprofen, jentadueto, dexcom g6 , dexcom g6 sensor, and dexcom g6 transmitter.    ROS:     ROS: 10 point ROS neg other than the symptoms noted above in the HPI.    GENERAL: mild fatigue, wt stable; denies fevers, chills, night sweats.   HEENT: no dysphagia, odonophagia, diplopia, neck pain  THYROID:  no apparent hyper or hypothyroid symptoms  CV: no chest pain, pressure, palpitations  LUNGS: no SOB, MILLER, cough, wheezing   ABDOMEN: no diarrhea, constipation, abdominal pain  EXTREMITIES: no rashes, ulcers, edema  NEUROLOGY: no headaches, denies changes in vision, tingling, extremitiy numbness   MSK: less right shoulder pain with ROM; denies muscle weakness  SKIN: no rashes or lesions  :  no menses since age 53  PSYCH:  stable mood, no significant anxiety or depression  ENDOCRINE: no heat or cold intolerance    Physical Exam   VS: BP (!) 149/83 (BP Location: Right arm, Patient Position: Sitting, Cuff Size: Adult  Regular)   Wt 52.6 kg (116 lb)   BMI 22.65 kg/m    GENERAL: AXOX3, NAD, short stature, well dressed, answering questions appropriately, appears stated age.  THYROID:  normal gland, no apparent nodules or goiter  HEENT: neck non-tender, no exopthalmous, no proptosis, EOMI  LUNGS: no audible wheeze, cough or visible cyanosis, no visible retractions or increased work of breathing  ABDOMEN: normal size, soft, nondistended  EXTREMITIES: no pedal edema  NEUROLOGY: CN grossly intact, no tremors  MSK: grossly intact  SKIN: darker skin complexion; no rashes, no lesions    LABS:    All pertinent notes, labs, and images personally reviewed by me.     A/P:  Encounter Diagnosis   Name Primary?     Type 2 diabetes mellitus without complication, without long-term current use of insulin (H) Yes         Comments:  Reviewed health history and diabetes issues.  Recent CGM glucose trends elevated, especially postmeal  Reviewed and interpreted tests that I previously ordered.   Ordered appropriate tests for the endocrinology disease management.    Management options discussed and implemented after shared medical decision making with the patient.  Diabetes problem is chronic with exacerbation progression, hyperglycemia    Plan:  Discussed general issues with the diabetes diagnosis and management  We discussed the hgbA1c test which reflects previous overall glucose levels or control  Discussed the importance of blood glucose (BG) testing to assess glucose trends  Provided general overview of the diabetes medication options and medication treatment plan  Reviewed use of the DexcomG6 CGM, use of Clarity sajan data if available    Recommend:  Continue current Jentadueto and glimeparide med use  Lower the glimeparide 2 mg as 1/2-tab daily  Monitor for symptom changes... distant history of sulfa med side effects but mild lip discoloration  Avoid the Jardiance med... she didn't tolerate it after PCP Rx  We have reviewed future med options using  daily vs weekly GLP1RA medication, stopping DPP4-I med use  Sent updated Rx's to [facility]  Keep focus on diet, exercise, and weight management..  Goal target FBG  mg/dl  Continue use of DexcomG7 CGM   Discussed the G7 option... new Rx sent to pharmacy   Assess glucose averages or TIR   Contact me if questions  Plan nonfasting lab tests in 3/2024   Discussed nearby [facility] option   Lab orders placed  Needs to schedule a follow-up eye exam, discussed some of the area eye clinic options  Advise having fasting lipid panel testing and dilated eye examination, at least annually    Addressed patient questions today    There are no Patient Instructions on file for this visit.    Future labs ordered today:   Orders Placed This Encounter   Procedures     GLUCOSE MONITOR, 72 HOUR, PHYS INTERP     Hemoglobin A1c     Basic metabolic panel     TSH     Radiology/Consults ordered today: None    Total time spent on day of encounter:  16 min    Follow-up:  3/2024    THERESA España MD, MS  Endocrinology  [facility]                      Again, thank you for allowing me to participate in the care of your patient.        Sincerely,        Constantino España MD

## 2023-10-25 NOTE — NURSING NOTE
Chief Complaint   Patient presents with    RECHECK     Type 2 diabetes mellitus without complication, without long-term current use of insulin (H)       Vitals:    10/25/23 1143   BP: (!) 149/83   BP Location: Right arm   Patient Position: Sitting   Cuff Size: Adult Regular   Weight: 52.6 kg (116 lb)       Body mass index is 22.65 kg/m .      Pedro Zuniga, [facility]

## 2023-11-04 ENCOUNTER — HEALTH MAINTENANCE LETTER (OUTPATIENT)
Age: 63
End: 2023-11-04

## 2023-11-29 ENCOUNTER — TRANSFERRED RECORDS (OUTPATIENT)
Dept: HEALTH INFORMATION MANAGEMENT | Facility: CLINIC | Age: 63
End: 2023-11-29
Payer: COMMERCIAL

## 2024-01-13 ENCOUNTER — HEALTH MAINTENANCE LETTER (OUTPATIENT)
Age: 64
End: 2024-01-13

## 2024-01-22 ENCOUNTER — TELEPHONE (OUTPATIENT)
Dept: ENDOCRINOLOGY | Facility: CLINIC | Age: 64
End: 2024-01-22
Payer: COMMERCIAL

## 2024-01-22 DIAGNOSIS — E11.9 TYPE 2 DIABETES MELLITUS WITHOUT COMPLICATION, WITHOUT LONG-TERM CURRENT USE OF INSULIN (H): Primary | ICD-10-CM

## 2024-01-22 NOTE — TELEPHONE ENCOUNTER
[facility] Call Center    Phone Message    May a detailed message be left on voicemail: yes     Reason for Call: Other: Patient calling, having symptoms regarding medication she is taking. Glucose went down but side effects include hungry all the time, stomach ache, itchy, bump on head. Not sure if she should continue taking. Please give patient a call back to discuss.    glimepiride (AMARYL) 2 MG tablet       linagliptin-metFORMIN (JENTADUETO) 2.5-1000 MG per tablet     Action Taken: Other: Endo    Travel Screening: Not Applicable

## 2024-01-23 NOTE — TELEPHONE ENCOUNTER
Called pt back.  States since using the glimepiride she has gained weight and is hungry all the time, has a stomach ache most of the day, and eczema like itchy bumps on head.  Pt states she watches what she eats. Avoids bread and fast food.  Eats a lot of veges.  Pt would like to get back on Janumet because she didn't have any side effects with that.  She would like to be on one medication (only janumet) if possible.  Also, pt rec'd a letter from Medicare that the dexcom may not be covered any longer.  States she will drop off letter.  If that is the case she would like to try the Felecia.  Will route to provider to advise.  Yvonne Holliday RN

## 2024-01-24 NOTE — TELEPHONE ENCOUNTER
Message noted... lots of issues raised.    She has been on Jentadueto and glimeparide, but not on Janumet recently.  If she wishes to stop the glimeparide, the glucose levels may rise significantly.  I don't expect better glucose control with alternative Janumet than the current Jentadueto.  I don't know if the Freestyle Libre2 covered by her insurance, but OK to switch from Dexcom to Felecia.    I advise:  - stop the glimeparide and continue the Jentadueto medication, see if any significant glucose rise  - finish current supply of Dexcom sensors, then change to Freestyle Libre2 sensors... I sent this Libre2 Rx to her pharmacy.  - If significant rise in glucose trends, have another appointment with the [facility] Diab Educator to review medication alternatives      She can call their scheduling office at [phone] to make the Diab Ed appt     Please relay message to patient, thanks.    THERESA España MD, MS  Endocrinology  [facility]

## 2024-01-24 NOTE — TELEPHONE ENCOUNTER
Called pt to relay provider msg.  Needs a refill of Jentadueto, and informed pt that she can call pharmacy as there is a 90 day refill left.  Pt verbalized understanding. Yvonne Holliday RN

## 2024-02-09 ENCOUNTER — OFFICE VISIT (OUTPATIENT)
Dept: FAMILY MEDICINE | Facility: CLINIC | Age: 64
End: 2024-02-09
Payer: COMMERCIAL

## 2024-02-09 VITALS
SYSTOLIC BLOOD PRESSURE: 134 MMHG | OXYGEN SATURATION: 100 % | DIASTOLIC BLOOD PRESSURE: 76 MMHG | TEMPERATURE: 97.9 F | BODY MASS INDEX: 22.71 KG/M2 | HEIGHT: 60 IN | WEIGHT: 115.7 LBS | RESPIRATION RATE: 16 BRPM | HEART RATE: 77 BPM

## 2024-02-09 DIAGNOSIS — E55.9 VITAMIN D DEFICIENCY: ICD-10-CM

## 2024-02-09 DIAGNOSIS — E11.9 TYPE 2 DIABETES MELLITUS WITHOUT COMPLICATION, WITHOUT LONG-TERM CURRENT USE OF INSULIN (H): ICD-10-CM

## 2024-02-09 DIAGNOSIS — Z11.4 SCREENING FOR HIV (HUMAN IMMUNODEFICIENCY VIRUS): ICD-10-CM

## 2024-02-09 DIAGNOSIS — Z12.31 ENCOUNTER FOR SCREENING MAMMOGRAM FOR BREAST CANCER: ICD-10-CM

## 2024-02-09 DIAGNOSIS — Z11.59 NEED FOR HEPATITIS C SCREENING TEST: ICD-10-CM

## 2024-02-09 DIAGNOSIS — Z00.00 MEDICARE ANNUAL WELLNESS VISIT, INITIAL: Primary | ICD-10-CM

## 2024-02-09 DIAGNOSIS — E78.5 HYPERLIPIDEMIA LDL GOAL <100: ICD-10-CM

## 2024-02-09 DIAGNOSIS — R10.84 ABDOMINAL PAIN, GENERALIZED: ICD-10-CM

## 2024-02-09 DIAGNOSIS — M85.80 OSTEOPENIA, UNSPECIFIED LOCATION: ICD-10-CM

## 2024-02-09 LAB
ALBUMIN SERPL BCG-MCNC: 4.5 G/DL (ref 3.5–5.2)
ALP SERPL-CCNC: 77 U/L (ref 40–150)
ALT SERPL W P-5'-P-CCNC: 17 U/L (ref 0–50)
ANION GAP SERPL CALCULATED.3IONS-SCNC: 10 MMOL/L (ref 7–15)
AST SERPL W P-5'-P-CCNC: 18 U/L (ref 0–45)
BILIRUB SERPL-MCNC: 0.8 MG/DL
BUN SERPL-MCNC: 12.6 MG/DL (ref 8–23)
CALCIUM SERPL-MCNC: 9.3 MG/DL (ref 8.8–10.2)
CHLORIDE SERPL-SCNC: 104 MMOL/L (ref 98–107)
CHOLEST SERPL-MCNC: 205 MG/DL
CREAT SERPL-MCNC: 0.47 MG/DL (ref 0.51–0.95)
CRP SERPL-MCNC: <3 MG/L
DEPRECATED HCO3 PLAS-SCNC: 26 MMOL/L (ref 22–29)
EGFRCR SERPLBLD CKD-EPI 2021: >90 ML/MIN/1.73M2
ERYTHROCYTE [DISTWIDTH] IN BLOOD BY AUTOMATED COUNT: 13.4 % (ref 10–15)
FASTING STATUS PATIENT QL REPORTED: YES
GLUCOSE SERPL-MCNC: 201 MG/DL (ref 70–99)
HBA1C MFR BLD: 6.5 % (ref 0–5.6)
HCT VFR BLD AUTO: 42.5 % (ref 35–47)
HCV AB SERPL QL IA: NONREACTIVE
HDLC SERPL-MCNC: 48 MG/DL
HGB BLD-MCNC: 14.4 G/DL (ref 11.7–15.7)
HIV 1+2 AB+HIV1 P24 AG SERPL QL IA: NONREACTIVE
LDLC SERPL CALC-MCNC: 108 MG/DL
MCH RBC QN AUTO: 29.9 PG (ref 26.5–33)
MCHC RBC AUTO-ENTMCNC: 33.9 G/DL (ref 31.5–36.5)
MCV RBC AUTO: 88 FL (ref 78–100)
NONHDLC SERPL-MCNC: 157 MG/DL
PLATELET # BLD AUTO: 226 10E3/UL (ref 150–450)
POTASSIUM SERPL-SCNC: 4.4 MMOL/L (ref 3.4–5.3)
PROT SERPL-MCNC: 7.1 G/DL (ref 6.4–8.3)
RBC # BLD AUTO: 4.81 10E6/UL (ref 3.8–5.2)
SODIUM SERPL-SCNC: 140 MMOL/L (ref 135–145)
TRIGL SERPL-MCNC: 243 MG/DL
VIT D+METAB SERPL-MCNC: 21 NG/ML (ref 20–50)
WBC # BLD AUTO: 7.2 10E3/UL (ref 4–11)

## 2024-02-09 PROCEDURE — G0402 INITIAL PREVENTIVE EXAM: HCPCS

## 2024-02-09 PROCEDURE — 86803 HEPATITIS C AB TEST: CPT

## 2024-02-09 PROCEDURE — 36415 COLL VENOUS BLD VENIPUNCTURE: CPT

## 2024-02-09 PROCEDURE — 83036 HEMOGLOBIN GLYCOSYLATED A1C: CPT

## 2024-02-09 PROCEDURE — 86140 C-REACTIVE PROTEIN: CPT

## 2024-02-09 PROCEDURE — 99214 OFFICE O/P EST MOD 30 MIN: CPT | Mod: 25

## 2024-02-09 PROCEDURE — 80053 COMPREHEN METABOLIC PANEL: CPT

## 2024-02-09 PROCEDURE — 87389 HIV-1 AG W/HIV-1&-2 AB AG IA: CPT

## 2024-02-09 PROCEDURE — 82306 VITAMIN D 25 HYDROXY: CPT

## 2024-02-09 PROCEDURE — 85027 COMPLETE CBC AUTOMATED: CPT

## 2024-02-09 PROCEDURE — 80061 LIPID PANEL: CPT

## 2024-02-09 RX ORDER — SIMETHICONE 125 MG
125 TABLET,CHEWABLE ORAL 4 TIMES DAILY PRN
Qty: 90 TABLET | Refills: 3 | Status: SHIPPED | OUTPATIENT
Start: 2024-02-09

## 2024-02-09 SDOH — HEALTH STABILITY: PHYSICAL HEALTH: ON AVERAGE, HOW MANY DAYS PER WEEK DO YOU ENGAGE IN MODERATE TO STRENUOUS EXERCISE (LIKE A BRISK WALK)?: 6 DAYS

## 2024-02-09 ASSESSMENT — SOCIAL DETERMINANTS OF HEALTH (SDOH): HOW OFTEN DO YOU GET TOGETHER WITH FRIENDS OR RELATIVES?: MORE THAN THREE TIMES A WEEK

## 2024-02-09 NOTE — Clinical Note
Please abstract the following data from this visit with this patient into the appropriate field in Epic:  Tests that can be patient reported without a hard copy:  Colonoscopy done on this date: 2019 (approximately), by this group: [facility], results were normal.   Other Tests found in the patient's chart through Chart Review/Care Everywhere:    Note to Abstraction: If this section is blank, no results were found via Chart Review/Care Everywhere.

## 2024-02-09 NOTE — Clinical Note
Please abstract the following data from this visit with this patient into the appropriate field in Epic:  Tests that can be patient reported without a hard copy:  Colonoscopy done on this date: 11/30/2018 (approximately), by this group: Doc, results were Normal.  and Mammogram done on this date: 11/15/2022 (approximately), by this group: Doc

## 2024-02-09 NOTE — PROGRESS NOTES
Preventive Care Visit  [facility] CARLEY Gan DNP, Geriatric Medicine  Feb 9, 2024    Assessment & Plan     Medicare annual wellness visit, initial  -See plan below    Type 2 diabetes mellitus without complication, without long-term current use of insulin (H)  -Stable on current regimen, follows with endocrinology.  Wears a CGM and will range from mid 70s to 300.  Foot exam performed today.  - Hemoglobin A1c  - Comprehensive metabolic panel    HYPERLIPIDEMIA LDL GOAL < 100  - not on statin therapy, prefers to recheck prior to initiation of statin therapy  - Lipid Profile    Abdominal pain, generalized  -Symptoms per HPI, suspicion for constipation so she will start taking MiraLAX 1-2 times a day for the next 2 weeks.  Will notify if symptoms do not improve.  - CBC with platelets  - simethicone (MYLICON) 125 MG chewable tablet  Dispense: 90 tablet; Refill: 3  - CRP inflammation    Encounter for screening mammogram for breast cancer  - MA Screening Digital Bilateral    Osteopenia, unspecified location  -Last DEXA scan with  - DX Hip/Pelvis/Spine    Screening for HIV (human immunodeficiency virus)  - HIV Antigen Antibody Combo    Need for hepatitis C screening test  - Hepatitis C Screen Reflex to HCV RNA Quant and Genotype    Vitamin D deficiency  -Does not currently take any supplement  - Vitamin D Deficiency              Counseling  Appropriate preventive services were discussed with this patient, including applicable screening as appropriate for fall prevention, nutrition, physical activity, Tobacco-use cessation, weight loss and cognition.  Checklist reviewing preventive services available has been given to the patient.  Reviewed patient's diet, addressing concerns and/or questions.   She is at risk for psychosocial distress and has been provided with information to reduce risk.   Information on urinary incontinence and treatment options given to patient.     Patient has been advised of split  "billing requirements and indicates understanding: Yes    FUTURE APPOINTMENTS:   -Follow-up in 1 month         - Follow-up for annual visit or as needed  Regular exercise    Subjective   Darcy is a 63 year old, presenting for the following:  Physical and Health Maintenance (Colonoscopy done on 11/30/2018, Mammogram done on 11/15/2022 at [facility], abstracted.)         Via the Health Maintenance questionnaire, the patient has reported the following services have been completed -Mammogram-Colonscopy, this information has been sent to the abstraction team.  Health Care Directive  Patient does not have a Health Care Directive or Living Will: Discussed advance care planning with patient; however, patient declined at this time.    Here for a physical.  Follows with an endocrinologist to help manage her diabetes.    Has been noticing some stomach aches with a lot of gas. Describes this as a cramping, mostly left sided but it is all over when she wakes up in the morning.This has been going on for several months but will get better throughout the day. Has been on the Jentadueto for about a year. Was on metformin prior to this for many years and tolerated this fine. Tries to eat a healthy diet, minimal rice/bread, no alcohol, no tobacco. Exercises every day. Has about 4 bowel movements/day, describes them as regular, between firm and \"normal.\" No blood in urine or stool. No vaginal bleeding. No bloating. No N/V, diarrhea. No fever, chills. Having some intermittent night sweats for the past 4 months, not drenching. Not having this every night. No unintentional weight loss.                     2/9/2024   General Health   How would you rate your overall physical health? Good   Feel stress (tense, anxious, or unable to sleep) Only a little   (!) STRESS CONCERN      2/9/2024   Nutrition   Diet: Diabetic         2/9/2024   Exercise   Days per week of moderate/strenous exercise 6 days         2/9/2024   Social Factors   Frequency of " gathering with friends or relatives More than three times a week   Worry food won't last until get money to buy more No   Food not last or not have enough money for food? No   Do you have housing?  Yes   Are you worried about losing your housing? No   Lack of transportation? No   Unable to get utilities (heat,electricity)? No         2/9/2024   Fall Risk   Fallen 2 or more times in the past year? No    No   Trouble with walking or balance? No    No          2/9/2024   Activities of Daily Living- Home Safety   Needs help with the following daily activites None of the above   Safety concerns in the home None of the above         2/9/2024   Dental   Dentist two times every year? Yes         2/9/2024   Hearing Screening   Hearing concerns? None of the above         2/9/2024   Driving Risk Screening   Patient/family members have concerns about driving No         2/9/2024   General Alertness/Fatigue Screening   Have you been more tired than usual lately? No         2/9/2024   Urinary Incontinence Screening   Bothered by leaking urine in past 6 months Yes          No data to display                  Today's PHQ-2 Score:       2/9/2024     7:09 AM   PHQ-2 ( 1999 [employer])   Q1: Little interest or pleasure in doing things 0   Q2: Feeling down, depressed or hopeless 0   PHQ-2 Score 0   Q1: Little interest or pleasure in doing things Not at all   Q2: Feeling down, depressed or hopeless Not at all   PHQ-2 Score 0           2/9/2024   Substance Use   Alcohol more than 3/day or more than 7/wk No   Do you have a current opioid prescription? No   How severe/bad is pain from 1 to 10? 4/10   Do you use any other substances recreationally? No     Social History     Tobacco Use    Smoking status: Never    Smokeless tobacco: Never   Substance Use Topics    Alcohol use: Never    Drug use: Never           4/12/2023   LAST FHS-7 RESULTS   1st degree relative breast or ovarian cancer Yes   Any relative bilateral breast cancer No         Mammogram Screening - Mammogram every 1-2 years updated in Health Maintenance based on mutual decision making          2/9/2024   One time HIV Screening   Previous HIV test? No     History of abnormal Pap smear: NO - age 63 - see link Cervical Cytology Screening Guidelines       The 10-year ASCVD risk score (Elvis NICOLE, et al., 2019) is: 11.6%    Values used to calculate the score:      Age: 63 years      Sex: Female      Is Non- : No      Diabetic: Yes      Tobacco smoker: No      Systolic Blood Pressure: 134 mmHg      Is BP treated: No      HDL Cholesterol: 51 mg/dL      Total Cholesterol: 192 mg/dL            Reviewed and updated as needed this visit by Provider   Tobacco   Meds  Problems  Med Hx  Surg Hx  Fam Hx            Past Medical History:   Diagnosis Date    Achilles tendon pain     previous right achilles surgery    Cervical pain (neck)     epidural    Chronic back pain     HTN (hypertension)     Hypertriglyceridemia     Near syncope     Osteoarthritis     Osteopenia     Type 2 diabetes mellitus without complication (H)      Past Surgical History:   Procedure Laterality Date    EXCISE CYST BAKER'S      LAPAROTOMY, TUBAL LIGATION, COMBINED      REMOVE IMPLANT BREAST       Lab work is in process  Current providers sharing in care for this patient include:  Patient Care Team:  Amarilys Gan DNP as PCP - General (Geriatric Medicine)  Constantino España MD as Assigned Endocrinology Provider  Andreia Seay RD as Diabetes Educator (Dietitian, Registered)  Clinic - [facility] as Assigned PCP    The following health maintenance items are reviewed in Epic and correct as of today:  Health Maintenance   Topic Date Due    ADVANCE CARE PLANNING  Never done    COLORECTAL CANCER SCREENING  Never done    HIV SCREENING  Never done    HEPATITIS C SCREENING  Never done    ZOSTER IMMUNIZATION (1 of 2) Never done    MAMMO SCREENING  04/25/2013    A1C  05/09/2024    LIPID   05/09/2024    BMP  10/19/2024    MICROALBUMIN  10/19/2024    DEXA  11/11/2024    EYE EXAM  11/29/2024    MEDICARE ANNUAL WELLNESS VISIT  02/09/2025    DIABETIC FOOT EXAM  02/09/2025    ANNUAL REVIEW OF HM ORDERS  02/09/2025    FALL RISK ASSESSMENT  02/09/2025    DTAP/TDAP/TD IMMUNIZATION (4 - Td or Tdap) 11/02/2030    PHQ-2 (once per calendar year)  Completed    INFLUENZA VACCINE  Completed    Pneumococcal Vaccine: 65+ Years  Completed    RSV VACCINE (Pregnancy & 60+)  Completed    COVID-19 Vaccine  Completed    IPV IMMUNIZATION  Aged Out    HPV IMMUNIZATION  Aged Out    MENINGITIS IMMUNIZATION  Aged Out    RSV MONOCLONAL ANTIBODY  Aged Out    PAP  Discontinued     Review of Systems    Review of Systems  Constitutional, HEENT, cardiovascular, pulmonary, gi and gu systems are negative, except as otherwise noted.     Objective    Exam  /76 (BP Location: Left arm, Patient Position: Sitting, Cuff Size: Adult Regular)   Pulse 77   Temp 97.9  F (36.6  C)   Resp 16   Ht 1.524 m (5')   Wt 52.5 kg (115 lb 11.2 oz)   SpO2 100%   BMI 22.60 kg/m     Estimated body mass index is 22.6 kg/m  as calculated from the following:    Height as of this encounter: 1.524 m (5').    Weight as of this encounter: 52.5 kg (115 lb 11.2 oz).    Physical Exam  Vitals reviewed.   HENT:      Head: Normocephalic.      Right Ear: Tympanic membrane, ear canal and external ear normal.      Left Ear: Tympanic membrane, ear canal and external ear normal.      Mouth/Throat:      Mouth: Mucous membranes are moist.   Eyes:      Pupils: Pupils are equal, round, and reactive to light.   Cardiovascular:      Rate and Rhythm: Normal rate and regular rhythm.      Pulses: Normal pulses.           Dorsalis pedis pulses are 2+ on the right side and 2+ on the left side.      Heart sounds: Normal heart sounds.   Pulmonary:      Effort: Pulmonary effort is normal.      Breath sounds: Normal breath sounds.   Chest:   Breasts:     Right: Normal.      Left:  Normal.      Comments: Implants bilaterally  Abdominal:      General: Bowel sounds are normal. There is distension.      Palpations: Abdomen is soft.   Musculoskeletal:         General: Normal range of motion.      Cervical back: Normal range of motion and neck supple.   Feet:      Right foot:      Protective Sensation: 10 sites tested.  10 sites sensed.      Skin integrity: Callus present.      Toenail Condition: Right toenails are normal.      Left foot:      Protective Sensation: 10 sites tested.  10 sites sensed.      Skin integrity: Callus present.      Toenail Condition: Left toenails are normal.   Skin:     General: Skin is warm and dry.   Neurological:      Mental Status: She is alert and oriented to person, place, and time.   Psychiatric:         Mood and Affect: Mood normal.         Behavior: Behavior normal.                2/9/2024   Mini Cog   Clock Draw Score 2 Normal   3 Item Recall 3 objects recalled   Mini Cog Total Score 5         Vision Screen  Vision Screen Results: Pass  No Corrective Lenses, PLUS LENS REQUIRED: Pass    Signed Electronically by: Amarilys Gan, DNP, APRN, CNP

## 2024-03-21 ENCOUNTER — LAB (OUTPATIENT)
Dept: LAB | Facility: CLINIC | Age: 64
End: 2024-03-21
Payer: COMMERCIAL

## 2024-03-21 DIAGNOSIS — E11.9 TYPE 2 DIABETES MELLITUS WITHOUT COMPLICATION, WITHOUT LONG-TERM CURRENT USE OF INSULIN (H): ICD-10-CM

## 2024-03-21 LAB
ANION GAP SERPL CALCULATED.3IONS-SCNC: 13 MMOL/L (ref 7–15)
BUN SERPL-MCNC: 13.1 MG/DL (ref 8–23)
CALCIUM SERPL-MCNC: 9.9 MG/DL (ref 8.8–10.2)
CHLORIDE SERPL-SCNC: 101 MMOL/L (ref 98–107)
CREAT SERPL-MCNC: 0.58 MG/DL (ref 0.51–0.95)
DEPRECATED HCO3 PLAS-SCNC: 26 MMOL/L (ref 22–29)
EGFRCR SERPLBLD CKD-EPI 2021: >90 ML/MIN/1.73M2
GLUCOSE SERPL-MCNC: 170 MG/DL (ref 70–99)
HBA1C MFR BLD: 7.1 % (ref 0–5.6)
POTASSIUM SERPL-SCNC: 5.2 MMOL/L (ref 3.4–5.3)
SODIUM SERPL-SCNC: 140 MMOL/L (ref 135–145)
TSH SERPL DL<=0.005 MIU/L-ACNC: 0.75 UIU/ML (ref 0.3–4.2)

## 2024-03-21 PROCEDURE — 36415 COLL VENOUS BLD VENIPUNCTURE: CPT

## 2024-03-21 PROCEDURE — 83036 HEMOGLOBIN GLYCOSYLATED A1C: CPT

## 2024-03-21 PROCEDURE — 80048 BASIC METABOLIC PNL TOTAL CA: CPT

## 2024-03-21 PROCEDURE — 84443 ASSAY THYROID STIM HORMONE: CPT

## 2024-03-25 ENCOUNTER — OFFICE VISIT (OUTPATIENT)
Dept: ENDOCRINOLOGY | Facility: CLINIC | Age: 64
End: 2024-03-25
Payer: COMMERCIAL

## 2024-03-25 VITALS
WEIGHT: 113.2 LBS | HEART RATE: 111 BPM | SYSTOLIC BLOOD PRESSURE: 135 MMHG | BODY MASS INDEX: 22.11 KG/M2 | DIASTOLIC BLOOD PRESSURE: 87 MMHG

## 2024-03-25 DIAGNOSIS — E11.9 TYPE 2 DIABETES MELLITUS WITHOUT COMPLICATION, WITHOUT LONG-TERM CURRENT USE OF INSULIN (H): Primary | ICD-10-CM

## 2024-03-25 PROCEDURE — G2211 COMPLEX E/M VISIT ADD ON: HCPCS | Performed by: INTERNAL MEDICINE

## 2024-03-25 PROCEDURE — 95251 CONT GLUC MNTR ANALYSIS I&R: CPT | Performed by: INTERNAL MEDICINE

## 2024-03-25 PROCEDURE — 99213 OFFICE O/P EST LOW 20 MIN: CPT | Performed by: INTERNAL MEDICINE

## 2024-03-25 RX ORDER — GLIPIZIDE 5 MG/1
5 TABLET, FILM COATED, EXTENDED RELEASE ORAL DAILY
Qty: 30 TABLET | Refills: 5 | Status: SHIPPED | OUTPATIENT
Start: 2024-03-25 | End: 2024-07-22

## 2024-03-25 NOTE — PROGRESS NOTES
Recent issues:   Diabetes follow-up evaluation.  Recent symptoms with eyes and her allergy problem, gets chronic allergy shots at Dr. Zavala's office  Symptoms of head itching and upset stomach in 1/2024, decided to stop glimeparide        2017. Diagnosis of diabetes mellitus  Medical evaluations with Cedric TOM/Doc  Initial treatment with metformin mediation    9/1/21. Initial diabetes evaluation with me at [place]  Reviewed health history and diabetes issues  Taking metformin 500 mg BID  Advised change to JanumetXR 100/1000 every day, then CDE eval and diagnostic Felecia  1/2023. Med change to Jardiance per PCP Dr. Parry, but significant symptoms after 1st dose and stopped it  Subsequent change from JanumetXR to Jentadueto medication  Stopped glimeparide due to concern for head itching and head bumps    Current DM medications:  Jentadueto 2.5/1000 1-tab in morning and evening  .  Blood glucose (BG) meter One Touch   Previously testing BID    2023. Changed from DexcomG6 to G7  Recent DexcomG7 data:            FamHx DM: Mother  Previous [facility] labs include:        Recent [facility] labs include:  Lab Results   Component Value Date    A1C 7.1 (H) 03/21/2024     03/21/2024    POTASSIUM 5.2 03/21/2024    CHLORIDE 101 03/21/2024    CO2 26 03/21/2024    ANIONGAP 13 03/21/2024     (H) 03/21/2024    BUN 13.1 03/21/2024    CR 0.58 03/21/2024    GFRESTIMATED >90 03/21/2024    GFRESTBLACK >90 12/27/2012    ARIEL 9.9 03/21/2024    CPEPT 1.8 06/15/2022    CHOL 205 (H) 02/09/2024    TRIG 243 (H) 02/09/2024    HDL 48 (L) 02/09/2024     (H) 02/09/2024    NHDL 157 (H) 02/09/2024    UCRR 148.0 10/19/2023    MICROL 12.0 10/19/2023    UMALCR 8.11 10/19/2023    TSH 0.75 03/21/2024     Last eye exam 11/2023 at [facility], no DR  DM Complications: none      Lives in [place], [facility]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 Harshal Parry/Doc for general medicine evaluations.  Also sees Dr. Mague Silveira  Lucas/Allergist, Dr. Misael Murphy/rheumatology     PMH/PSH:  Past Medical History:   Diagnosis Date    Achilles tendon pain     previous right achilles surgery    Allergies     Cervical pain (neck)     epidural    Chronic back pain     HTN (hypertension)     Hypertriglyceridemia     Near syncope     Osteoarthritis     Osteopenia     Type 2 diabetes mellitus without complication (H)      Past Surgical History:   Procedure Laterality Date    EXCISE CYST BAKER'S      LAPAROTOMY, TUBAL LIGATION, COMBINED      REMOVE IMPLANT BREAST         Family Hx:  Family History   Problem Relation Age of Onset    Diabetes Mother     Dementia Mother     Hypertension Father     Lung Cancer Father     Ovarian Cancer Sister     Breast Cancer No family hx of     Colon Cancer No family hx of          Social Hx:  Social History     Socioeconomic History    Marital status:      Spouse name: Not on file    Number of children: Not on file    Years of education: Not on file    Highest education level: Not on file   Occupational History    Not on file   Tobacco Use    Smoking status: Never    Smokeless tobacco: Never   Substance and Sexual Activity    Alcohol use: Never    Drug use: Never    Sexual activity: Not on file   Other Topics Concern    Not on file   Social History Narrative    Not on file     Social Determinants of Health     Financial Resource Strain: Low Risk  (2/9/2024)    Financial Resource Strain     Within the past 12 months, have you or your family members you live with been unable to get utilities (heat, electricity) when it was really needed?: No   Food Insecurity: Low Risk  (2/9/2024)    Food Insecurity     Within the past 12 months, did you worry that your food would run out before you got money to buy more?: No     Within the past 12 months, did the food you bought just not last and you didn t have money to get more?: No   Transportation Needs: Low Risk  (2/9/2024)    Transportation Needs     Within the past 12  months, has lack of transportation kept you from medical appointments, getting your medicines, non-medical meetings or appointments, work, or from getting things that you need?: No   Physical Activity: Unknown (2/9/2024)    Exercise Vital Sign     Days of Exercise per Week: 6 days     Minutes of Exercise per Session: Not on file   Stress: No Stress Concern Present (2/9/2024)    [employer] - Occupational Stress Questionnaire     Feeling of Stress : Only a little   Social Connections: Unknown (2/9/2024)    Social Connection and Isolation Panel [NHANES]     Frequency of Communication with Friends and Family: Not on file     Frequency of Social Gatherings with Friends and Family: More than three times a week     Attends Alevism Services: Not on file     Active Member of Clubs or Organizations: Not on file     Attends Club or Organization Meetings: Not on file     Marital Status: Not on file   Interpersonal Safety: Not on file   Housing Stability: Low Risk  (2/9/2024)    Housing Stability     Do you have housing? : Yes     Are you worried about losing your housing?: No          MEDICATIONS:  has a current medication list which includes the following prescription(s): glipizide, ibuprofen, jentadueto, dexcom g7 , freestyle madai 2 sensor, and simethicone.    ROS:     ROS: 10 point ROS neg other than the symptoms noted above in the HPI.    GENERAL: mild fatigue, wt stable; denies fevers, chills, night sweats.   HEENT: no dysphagia, odonophagia, diplopia, neck pain  THYROID:  no apparent hyper or hypothyroid symptoms  CV: no chest pain, pressure, palpitations  LUNGS: no SOB, MILLER, cough, wheezing   ABDOMEN: no diarrhea, constipation, abdominal pain  EXTREMITIES: no rashes, ulcers, edema  NEUROLOGY: no headaches, denies changes in vision, tingling, extremitiy numbness   MSK: less right shoulder pain with ROM; denies muscle weakness  SKIN: no rashes or lesions  :  no menses since age  53  PSYCH:  stable mood, no significant anxiety or depression  ENDOCRINE: no heat or cold intolerance    Physical Exam   VS: /87   Pulse 111   Wt 51.3 kg (113 lb 3.2 oz)   BMI 22.11 kg/m    GENERAL: AXOX3, NAD, short stature, well dressed, answering questions appropriately, appears stated age.  THYROID:  normal gland, no apparent nodules or goiter  HEENT: neck non-tender, no exopthalmous, no proptosis, EOMI  LUNGS: no audible wheeze, cough or visible cyanosis, no visible retractions or increased work of breathing  ABDOMEN: normal size, soft, nondistended  EXTREMITIES: no pedal edema  NEUROLOGY: CN grossly intact, no tremors  MSK: grossly intact  SKIN: darker skin complexion; no rashes, no lesions    LABS:    All pertinent notes, labs, and images personally reviewed by me.     A/P:  Encounter Diagnosis   Name Primary?    Type 2 diabetes mellitus without complication, without long-term current use of insulin (H) Yes       Comments:  Reviewed health history and diabetes issues.  Recent CGM glucose trends elevated, especially postmeal  Reviewed and interpreted tests that I previously ordered.   Ordered appropriate tests for the endocrinology disease management.    Management options discussed and implemented after shared medical decision making with the patient.  Diabetes problem is chronic with exacerbation progression, hyperglycemia    Plan:  Discussed general issues with the diabetes diagnosis and management  We discussed the hgbA1c test which reflects previous overall glucose levels or control  Discussed the importance of blood glucose (BG) testing to assess glucose trends  Provided general overview of the diabetes medication options and medication treatment plan  Reviewed recent DexcomG7 CGM glucose trend data, in detail    Recommend:  Continue current Jentadueto medication  Add a different ARRIAZA medication as glipizideER 5 mg daily  Monitor for symptom changes... distant history of sulfa med side effects but  mild lip discoloration  Avoid the Jardiance med... she didn't tolerate it after PCP Rx  We have reviewed future med options using daily vs weekly GLP1RA medication, stopping DPP4-I med use  [facility] updated [facility]  Keep focus on diet, exercise, and weight management..  Goal target FBG  mg/dl  Continue use of DexcomG7 CGM  No labs ordered at this time  Advise having fasting lipid panel testing and dilated eye examination, at least annually    Addressed patient questions today    The longitudinal plan of care for the endocrine problem(s) were addressed during this visit.  Due to added complexity of care,   we will continue to support the patient and the subsequent management of this condition with ongoing continuity of care.    Patient Instructions   Diabetes med plan:  Continue current Jentadueto 2.5/1000 as 1-tab in morning and evening  Start a medicine similar to glimeparide called glipizideER   Take glipizideER 5 mg capsule once daily in morning  Monitor the glucose trends with DexcomG7   Goal average 140-160 mg/dl range  Monitor for the unlikely medication side effects:   Upset stomach   Head or skin itching  Send Dr. España a [employer] message in 1-2 weeks or as needed    Next endocrinology appointments:   6/17/24 at 2:30 pm, video visit   10/2024 as office appointment    Future labs ordered today:   Orders Placed This Encounter   Procedures    GLUCOSE MONITOR, 72 HOUR, PHYS INTERP     Radiology/Consults ordered today: None    Total time spent on day of encounter:  26 min    Follow-up:  6/17/24 at 2:30p, VVAm,     10/2024 Return    THERESA España MD, MS  Endocrinology  [facility]    CC:  MANISHA Gan

## 2024-03-25 NOTE — PATIENT INSTRUCTIONS
Diabetes med plan:  Continue current Jentadueto 2.5/1000 as 1-tab in morning and evening  Start a medicine similar to glimeparide called glipizideER   Take glipizideER 5 mg capsule once daily in morning  Monitor the glucose trends with DexcomG7   Goal average 140-160 mg/dl range  Monitor for the unlikely medication side effects:   Upset stomach   Head or skin itching  Send Dr. España a [employer] message in 1-2 weeks or as needed    Next endocrinology appointments:   6/17/24 at 2:30 pm, video visit   10/2024 as office appointment

## 2024-04-22 DIAGNOSIS — E11.9 TYPE 2 DIABETES MELLITUS WITHOUT COMPLICATION, WITHOUT LONG-TERM CURRENT USE OF INSULIN (H): ICD-10-CM

## 2024-04-22 RX ORDER — LINAGLIPTIN AND METFORMIN HYDROCHLORIDE 2.5; 1 MG/1; MG/1
1 TABLET, FILM COATED ORAL 2 TIMES DAILY WITH MEALS
Qty: 180 TABLET | Refills: 1 | Status: SHIPPED | OUTPATIENT
Start: 2024-04-22 | End: 2024-08-26

## 2024-04-22 NOTE — TELEPHONE ENCOUNTER
"Last Written Prescription Date:  10/25/23  Last Fill Quantity: 180,  # refills: 1   Last office visit: 3/25/2024    Future Office Visit:  6/17/24        Requested Prescriptions   Pending Prescriptions Disp Refills    JENTADUETO 2.5-1000 MG per tablet [Pharmacy Med Name: JENTADUETO 2.5 MG-1000 MG TAB] 180 tablet 1     Sig: TAKE 1 TABLET BY MOUTH TWICE A DAY WITH MEALS       Combination Oral Antihyperglycemic Agents Passed - 4/22/2024 12:23 AM        Passed - Patient has documented A1c within the specified period of time.     If HgbA1C is 8 or greater, it needs to be on file within the past 3 months.  If less than 8, must be on file within the past 6 months.     Recent Labs   Lab Test 03/21/24  0916   A1C 7.1*             Passed - Patient's CR is NOT>1.4 OR Patient's EGFR is NOT<45 within past 12 mos.     Recent Labs   Lab Test 03/21/24  0916   GFRESTIMATED >90       Recent Labs   Lab Test 03/21/24  0916   CR 0.58             Passed - Patient does not have a diagnosis of CHF.        Passed - Medication is active on med list        Passed - Patient is 18 years old or older.        Passed - Patient is not pregnant        Passed - Patient has not had a positive pregnancy test within the past 12 mos.        Passed - Recent (6 mo) or future (30 days) visit within the authorizing provider's specialty     Patient had office visit in the last 6 months or has a visit in the next 30 days with authorizing provider or within the authorizing provider's specialty.  See \"Patient Info\" tab in inbasket, or \"Choose Columns\" in Meds & Orders section of the refill encounter.               Refills sent  Yvonne Holliday RN    "

## 2024-05-01 ENCOUNTER — HOSPITAL ENCOUNTER (OUTPATIENT)
Dept: MAMMOGRAPHY | Facility: CLINIC | Age: 64
Discharge: HOME | End: 2024-05-01
Payer: COMMERCIAL

## 2024-05-01 ENCOUNTER — HOSPITAL ENCOUNTER (OUTPATIENT)
Dept: BONE DENSITY | Facility: CLINIC | Age: 64
Discharge: HOME | End: 2024-05-01
Payer: COMMERCIAL

## 2024-05-01 DIAGNOSIS — M85.88 OSTEOPENIA OF LUMBAR SPINE: ICD-10-CM

## 2024-05-01 DIAGNOSIS — M85.80 OSTEOPENIA, UNSPECIFIED LOCATION: ICD-10-CM

## 2024-05-01 DIAGNOSIS — Z12.31 ENCOUNTER FOR SCREENING MAMMOGRAM FOR BREAST CANCER: ICD-10-CM

## 2024-05-01 PROCEDURE — 77080 DXA BONE DENSITY AXIAL: CPT

## 2024-05-01 PROCEDURE — 77063 BREAST TOMOSYNTHESIS BI: CPT

## 2024-05-13 ENCOUNTER — TELEPHONE (OUTPATIENT)
Dept: ENDOCRINOLOGY | Facility: CLINIC | Age: 64
End: 2024-05-13
Payer: COMMERCIAL

## 2024-05-13 NOTE — TELEPHONE ENCOUNTER
[facility] Call Center    Phone Message    May a detailed message be left on voicemail: yes     Reason for Call: Medication Question or concern regarding medication   Prescription Clarification    Name of Medication: JENTADUETO / Dexcom refill    Prescribing Provider: Taco     Pharmacy: [facility] #[id number] - [place], [place] - [address]    What on the order needs clarification?  Pharmacy called patient wanting her to let patient know that she will most likely need an alternative med for her JENTADUETO  next time it needs to be filled because they will not have it. Patient does need dexcom refills as she is on her last one day please advise.      Action Taken: Message routed to:  [facility] endo    Travel Screening: Not Applicable

## 2024-05-14 DIAGNOSIS — E11.9 TYPE 2 DIABETES MELLITUS WITHOUT COMPLICATION, WITHOUT LONG-TERM CURRENT USE OF INSULIN (H): Primary | ICD-10-CM

## 2024-05-14 RX ORDER — ACYCLOVIR 400 MG/1
1 TABLET ORAL CONTINUOUS PRN
Qty: 3 EACH | Refills: 5 | Status: SHIPPED | OUTPATIENT
Start: 2024-05-14 | End: 2024-06-05

## 2024-05-15 NOTE — TELEPHONE ENCOUNTER
Message reviewed, but confusing.  If her [facility] pharmacy does not have Jentadueto, we should find another pharmacy that has access to this medication.  If Jentadueto is not covered by her insurance, then that's a different issue.  Need more information from patient, please call her to clarify.    I have sent a new DexcomG7 sensor Rx to her [facility] pharmacy today.    THERESA España MD, MS  Endocrinology  [facility]

## 2024-05-15 NOTE — TELEPHONE ENCOUNTER
Called pharmacy.  States pt already picked up rx, so no issues with [employer] supply. Yvonne Holliday RN

## 2024-05-16 ENCOUNTER — TELEPHONE (OUTPATIENT)
Dept: ENDOCRINOLOGY | Facility: CLINIC | Age: 64
End: 2024-05-16
Payer: COMMERCIAL

## 2024-05-16 NOTE — TELEPHONE ENCOUNTER
Prior Authorization Retail Medication Request    Medication/Dose: Dexcom G7 Sensor  Diagnosis and ICD code (if different than what is on RX):   New/renewal/insurance change PA/secondary ins. PA:  Previously Tried and Failed:    Rationale:      Insurance   Primary: [facility]  Insurance ID:  [id number]     Secondary (if applicable):  Insurance ID:      Pharmacy Information (if different than what is on RX)  Name:    Phone:    Fax:

## 2024-05-16 NOTE — TELEPHONE ENCOUNTER
[facility] Call Center    Phone Message    May a detailed message be left on voicemail: yes     Reason for Call: Other: Patient called pharmacy and insurance and they stated they need prior auth for  Continuous Glucose Sensor (DEXCOM G7 SENSOR) MISC [[id number]]  before they can give sensors to patient please advise.    Action Taken: Message routed to:  [facility] endo    Travel Screening: Not Applicable

## 2024-05-17 NOTE — TELEPHONE ENCOUNTER
PA Initiation    Medication: DEXCOM G7 SENSOR MISC  Insurance Company: [employer] - Phone [phone] Fax [phone]  Pharmacy Filling the Rx:    Filling Pharmacy Phone:    Filling Pharmacy Fax:    Start Date: 5/17/2024

## 2024-05-23 NOTE — TELEPHONE ENCOUNTER
PRIOR AUTHORIZATION DENIED       Patient is not on insulin    Medication: DEXCOM G7 SENSOR MISC  Insurance Company: [employer] - Phone [phone] Fax [phone]  Denial Date: 5/20/2024  Denial Reason(s):   Appeal Information:   Patient Notified: text

## 2024-05-29 NOTE — TELEPHONE ENCOUNTER
DexcomG7 CGM sensor insurance denial noted.  Patient not using insulin medication and no documented hypoglycemia issues.    THERESA España MD, MS  Endocrinology  [facility]

## 2024-06-05 ENCOUNTER — TELEPHONE (OUTPATIENT)
Dept: ENDOCRINOLOGY | Facility: CLINIC | Age: 64
End: 2024-06-05
Payer: COMMERCIAL

## 2024-06-05 DIAGNOSIS — E11.9 TYPE 2 DIABETES MELLITUS WITHOUT COMPLICATION, WITHOUT LONG-TERM CURRENT USE OF INSULIN (H): ICD-10-CM

## 2024-06-05 RX ORDER — ACYCLOVIR 400 MG/1
1 TABLET ORAL CONTINUOUS PRN
Qty: 3 EACH | Refills: 5 | Status: SHIPPED | OUTPATIENT
Start: 2024-06-05

## 2024-06-05 NOTE — TELEPHONE ENCOUNTER
[facility] Call Center    Phone Message    May a detailed message be left on voicemail: yes     Reason for Call: Other: Pt is calling into request an order to be placed for the [facility] pharmacy for her Dexcom G7. Pt called [facility] and they will not be covering the Dexcom device as she is not on insulin. Pt is planning to pay out of pocket  but would like for an order to be placed for her to buy it on [employer]. Please advise.      Action Taken: Other: Endo     Travel Screening: Not Applicable     Date of Service: 6/05/24

## 2024-06-10 ENCOUNTER — NURSE TRIAGE (OUTPATIENT)
Dept: FAMILY MEDICINE | Facility: CLINIC | Age: 64
End: 2024-06-10
Payer: COMMERCIAL

## 2024-06-10 NOTE — TELEPHONE ENCOUNTER
Reason for Call:  Appointment Request    Patient requesting this type of appt:  ISSUES WITH FEET, NUMB TOES AND SOMETIMES HURTS AT NIGHT    Requested provider: Amarilys Gan    Reason patient unable to be scheduled: Not within requested timeframe    When does patient want to be seen/preferred time: 3-7 days    Comments: ONLY WANTS CARLEY BUT WILL SEE ANYONE    Could we send this information to you in [facility] or would you prefer to receive a phone call?:   Patient would prefer a phone call   Okay to leave a detailed message?: Yes at Cell number on file:    Telephone Information:   Mobile [phone]       Call taken on 6/10/2024 at 8:36 AM by Katelyn ETIENNE

## 2024-06-11 ENCOUNTER — TELEPHONE (OUTPATIENT)
Dept: ENDOCRINOLOGY | Facility: CLINIC | Age: 64
End: 2024-06-11
Payer: COMMERCIAL

## 2024-06-11 DIAGNOSIS — E11.9 TYPE 2 DIABETES MELLITUS WITHOUT COMPLICATION, WITHOUT LONG-TERM CURRENT USE OF INSULIN (H): Primary | ICD-10-CM

## 2024-06-11 NOTE — TELEPHONE ENCOUNTER
"Message reviewed. If patient cannot afford the Dexcom (or Felecia) continuous glucose sensors, she should have a BG testing review session with one of our [facility] diabetes educators... again.  I have placed a new \"referral\" and she can call [phone] to make an appointment.  Please relay message, thanks.    THERESA España MD, [facility]        "

## 2024-06-11 NOTE — TELEPHONE ENCOUNTER
[facility] Call Center    Phone Message    May a detailed message be left on voicemail: yes     Reason for Call: Other: She can not afford the Dex Com G7 any longer, she needs an alternative to check her blood, please call.       Action Taken: Message routed to:  [facility] Endo    Travel Screening: Not Applicable     [id number] [place], [place] - [address]    Date of Service:

## 2024-06-11 NOTE — TELEPHONE ENCOUNTER
Per triage protocol, patient to be scheduled an appointment within 3-days. Patient did agree to recommendation and was scheduled an appointment.    Reason for Disposition   Numbness or tingling in one or both feet is a chronic symptom (recurrent or ongoing problem lasting > 4 weeks)    Additional Information   Negative: Difficult to awaken or acting confused (e.g., disoriented, slurred speech)   Negative: New neurologic deficit that is present NOW, sudden onset of ANY of the following: * Weakness of the face, arm, or leg on one side of the body* Numbness of the face, arm, or leg on one side of the body* Loss of speech or garbled speech   Negative: Sounds like a life-threatening emergency to the triager   Negative: SEVERE weakness (i.e., unable to walk or barely able to walk, requires support) and new-onset or worsening   Negative: Confusion, disorientation, or hallucinations is main symptom   Negative: Dizziness is main symptom   Negative: Followed a head injury within last 3 days   Negative: Headache (with neurologic deficit)   Negative: Unable to urinate (or only a few drops) and bladder feels very full   Negative: Loss of bladder or bowel control (urine or bowel incontinence; wetting self, leaking stool) of new-onset   Negative: Back pain with numbness (loss of sensation) in groin or rectal area   Negative: Neurologic deficit that was brief (now gone), ANY of the following: * Weakness of the face, arm, or leg on one side of the body * Numbness of the face, arm, or leg on one side of the body * Loss of speech or garbled speech   Negative: Patient sounds very sick or weak to the triager   Negative: Neurologic deficit of gradual onset (e.g., days to weeks), ANY of the following: * Weakness of the face, arm, or leg on one side of the body* Numbness of the face, arm, or leg on one side of the body* Loss of speech or garbled speech   Negative: [place] palsy suspected (i.e., weakness only one side of the face, developing  "over hours to days, no other symptoms)   Negative: Tingling (e.g., pins and needles) of the face, arm or leg on one side of the body, that is present now (Exceptions: Chronic or recurrent symptom lasting > 4 weeks; or tingling from known cause, such as: bumped elbow, carpal tunnel syndrome, pinched nerve, frostbite.)   Negative: Neck pain (with neurologic deficit)   Negative: Back pain (with neurologic deficit)   Negative: Patient wants to be seen   Negative: Loss of speech or garbled speech is a chronic symptom (recurrent or ongoing problem lasting > 4 weeks)   Negative: Weakness of arm or leg is a chronic symptom (recurrent or ongoing problem lasting > 4 weeks)   Negative: Numbness or tingling in one or both hands is a chronic symptom (recurrent or ongoing problem lasting > 4 weeks)    Answer Assessment - Initial Assessment Questions  1. SYMPTOM: \"What is the main symptom you are concerned about?\" (e.g., weakness, numbness)      Numbness both feet, but mostly right foot  2. ONSET: \"When did this start?\" (minutes, hours, days; while sleeping)      \"Noticed a long time ago--\"Years\"  3. LAST NORMAL: \"When was the last time you (the patient) were normal (no symptoms)?\"      N/A  4. PATTERN \"Does this come and go, or has it been constant since it started?\"  \"Is it present now?\"      Intermittent. Presents when elevating lower extremity  5. CARDIAC SYMPTOMS: \"Have you had any of the following symptoms: chest pain, difficulty breathing, palpitations?\"      Denies  6. NEUROLOGIC SYMPTOMS: \"Have you had any of the following symptoms: headache, dizziness, vision loss, double vision, changes in speech, unsteady on your feet?\"      Unsteady on feet~     Denies other symptoms. Denies discoloration of feet  7. OTHER SYMPTOMS: \"Do you have any other symptoms?\"      Denies    Protocols used: Neurologic Deficit-A-OH    "

## 2024-06-12 NOTE — TELEPHONE ENCOUNTER
Called pt to review provider msg with pt.  States she will be talking to a diabetic educator with [facility] (every wed?), and will also be seeing her PCP tomorrow.  She also has appt with Dr. España next week as well.  Pt verbalized understanding, and will call to scheduled with [facility] diab educator if needed. Yvonne Holliday RN

## 2024-06-13 ENCOUNTER — OFFICE VISIT (OUTPATIENT)
Dept: FAMILY MEDICINE | Facility: CLINIC | Age: 64
End: 2024-06-13
Payer: COMMERCIAL

## 2024-06-13 VITALS
BODY MASS INDEX: 23.16 KG/M2 | HEIGHT: 60 IN | TEMPERATURE: 96.9 F | WEIGHT: 118 LBS | RESPIRATION RATE: 16 BRPM | SYSTOLIC BLOOD PRESSURE: 137 MMHG | HEART RATE: 82 BPM | DIASTOLIC BLOOD PRESSURE: 77 MMHG | OXYGEN SATURATION: 98 %

## 2024-06-13 DIAGNOSIS — E11.9 TYPE 2 DIABETES MELLITUS WITHOUT COMPLICATION, WITHOUT LONG-TERM CURRENT USE OF INSULIN (H): ICD-10-CM

## 2024-06-13 DIAGNOSIS — R25.2 MUSCLE CRAMP, NOCTURNAL: Primary | ICD-10-CM

## 2024-06-13 DIAGNOSIS — E55.9 VITAMIN D DEFICIENCY: ICD-10-CM

## 2024-06-13 PROCEDURE — 99213 OFFICE O/P EST LOW 20 MIN: CPT | Performed by: PHYSICIAN ASSISTANT

## 2024-06-13 ASSESSMENT — PAIN SCALES - GENERAL: PAINLEVEL: NO PAIN (0)

## 2024-06-13 NOTE — PATIENT INSTRUCTIONS
Start taking a B complex vitamin each day plus a magnesium supplement for the muscle cramps.  Stretch before bedtime and make sure you drink enough water  Start vitamin D 2000IU/day for the vitamin D deficiency

## 2024-06-13 NOTE — PROGRESS NOTES
HPI: Darcy is a 64 yo female with DM and vit D def here with cramping in arms and feet and legs at night in bed  She has pain in the tips of the toes and has tingling    She uses a CGM but this is expensive to may stop using this   She hates pricking her finger however    She forget to start vitamin D after her appt in FEb    TSH   Date Value Ref Range Status   03/21/2024 0.75 0.30 - 4.20 uIU/mL Final   06/15/2022 0.59 0.40 - 4.00 mU/L Final     Lab Results   Component Value Date    A1C 7.1 03/21/2024    A1C 6.5 02/09/2024    A1C 5.4 10/19/2023    A1C 7.3 05/09/2023    A1C 7.4 03/07/2023       Past Medical History:   Diagnosis Date    Achilles tendon pain     previous right achilles surgery    Allergies     Cervical pain (neck)     epidural    Chronic back pain     HTN (hypertension)     Hypertriglyceridemia     Near syncope     Osteoarthritis     Osteopenia     Type 2 diabetes mellitus without complication (H)      Past Surgical History:   Procedure Laterality Date    EXCISE CYST BAKER'S      LAPAROTOMY, TUBAL LIGATION, COMBINED      REMOVE IMPLANT BREAST       Social History     Tobacco Use    Smoking status: Never    Smokeless tobacco: Never   Substance Use Topics    Alcohol use: Never     Current Outpatient Medications   Medication Sig Dispense Refill    Continuous Blood Gluc  (DEXCOM G7 ) LINDA Use to read blood sugars as per 's instructions. 1 each 0    Continuous Glucose Sensor (DEXCOM G7 SENSOR) MISC 1 Device continuous prn (Use for continuous glucose testing, change every 10 days) 3 each 5    glipiZIDE (GLUCOTROL XL) 5 MG 24 hr tablet Take 1 tablet (5 mg) by mouth daily 30 tablet 5    Ibuprofen (ADVIL PO)       JENTADUETO 2.5-1000 MG per tablet TAKE 1 TABLET BY MOUTH TWICE A DAY WITH MEALS 180 tablet 1    simethicone (MYLICON) 125 MG chewable tablet Take 1 tablet (125 mg) by mouth 4 times daily as needed for intestinal gas 90 tablet 3     Allergies   Allergen Reactions    Sulfa  Antibiotics Other (See Comments)     Lip discoloration?     PHYSICAL EXAM:    /77 (BP Location: Right arm, Patient Position: Sitting, Cuff Size: Adult Regular)   Pulse 82   Temp 96.9  F (36.1  C) (Temporal)   Resp 16   Ht 1.524 m (5')   Wt 53.5 kg (118 lb)   SpO2 98%   BMI 23.05 kg/m      Patient appears non toxic    Assessment and Plan:     (R25.2) Muscle cramp, nocturnal  (primary encounter diagnosis)  Comment:   Plan: CK total        Start B complex vitamin and magnesium. Stay well hydrated and do some stretching before bedtime. If the cramping persists, schedule lab only for CK.    (E55.9) Vitamin D deficiency  Comment:   Plan: she was not aware of her def and not on vit D which could also cause muscle cramping. Start vitamin D 2000U/d with food.    (E11.9) Type 2 diabetes mellitus without complication, without long-term current use of insulin (H)  Comment:   Plan: she does not need to check her blood sugars 3 times per day. She is not on insulin. Checking once every other day would suffice. CGM too expensive.  Lab Results   Component Value Date    A1C 7.1 03/21/2024    A1C 6.5 02/09/2024    A1C 5.4 10/19/2023    A1C 7.3 05/09/2023    A1C 7.4 03/07/2023           Nalini Barth PA-C

## 2024-06-17 NOTE — TELEPHONE ENCOUNTER
Patient decided to cancel the scheduled endocrinology video visit appt today.  Her insurance will not cover the Dexcom (or other CGM) sensors.  As noted in this message chain, she should see one of our [facility] diabetes educators to review glucose testing options.  I have previously placed a referral... she should call [phone] to make the Diab Ed appt soon.     Please call her with this feedback... again.  Thanks.    THERESA España MD, MS  Endocrinology  [facility]

## 2024-07-02 ENCOUNTER — ALLIED HEALTH/NURSE VISIT (OUTPATIENT)
Dept: EDUCATION SERVICES | Facility: CLINIC | Age: 64
End: 2024-07-02
Attending: INTERNAL MEDICINE
Payer: COMMERCIAL

## 2024-07-02 DIAGNOSIS — E11.9 TYPE 2 DIABETES MELLITUS WITHOUT COMPLICATION, WITHOUT LONG-TERM CURRENT USE OF INSULIN (H): ICD-10-CM

## 2024-07-02 PROCEDURE — G0108 DIAB MANAGE TRN  PER INDIV: HCPCS | Performed by: DIETITIAN, REGISTERED

## 2024-07-02 RX ORDER — LANCETS
EACH MISCELLANEOUS
Qty: 100 EACH | Refills: 6 | Status: SHIPPED | OUTPATIENT
Start: 2024-07-02

## 2024-07-02 NOTE — PATIENT INSTRUCTIONS
I sent meter & supplies to your pharmacy -- ask for AccuChek, if covered by your insurance   Check blood sugar once daily either first thing in the morning or 2 hours after a meal  Goals are  mg/dL fasting or <180 mg/dL after meals  Use the Dexcom G7 for 10 days every so often -- maybe once a month for 10 days -- to bring the cost down   Get your A1C retested -- I put in orders for you to do this at your convenience. Goal is <7%.     Call or send a [facility] message with any questions or concerns    Andreia Seay RD, LD, [facility]   Diabetes Education Triage Line: [phone]  Diabetes Education Appointment [phone]

## 2024-07-02 NOTE — LETTER
7/2/2024         RE: Darcy Story  [address] [place] [address]        Dear Colleague,    Thank you for referring your patient, Darcy Story, to the [facility]. Please see a copy of my visit note below.    Diabetes Self-Management Education & Support    Presents for: Follow-up    Type of Service: In Person Visit      ASSESSMENT:  Darcy had been using a Dexcom CGMS but this is no longer well covered by her insurance and she cannot afford this. She is disappointed that she'll  need to go back to finger pokes but is willing to do this until over the counter CGMSs are on the market. We reviewed testing schedule, meter options, as well as occasional CGM use to spread the cost over several months. She is agreeable to doing this.    Patient's most recent   Lab Results   Component Value Date    A1C 7.1 03/21/2024     is not meeting goal of <7.0    Diabetes knowledge and skills assessment:   Patient is knowledgeable in diabetes management concepts related to: Healthy Eating, Being Active, Monitoring, Taking Medication, and Reducing Risks    Continue education with the following diabetes management concepts: Healthy Coping    Based on learning assessment above, most appropriate setting for further diabetes education would be: Group class or Individual setting.      PLAN    Check blood sugars 1 time daily, orders placed for meter & supplies  Consider occasional use of Dexcom G7 to decrease monthly cost  Get A1C rechecked, orders placed per CDCES policy     Topics to cover at upcoming visits: Healthy Coping    Follow-up: 10/2/24    See Care Plan for co-developed, patient-state behavior change goals.  AVS provided for patient today.    Education Materials Provided:  BG Log Sheet      SUBJECTIVE/OBJECTIVE:  Presents for: Follow-up  Accompanied by: Self  Diabetes education in the past 24mo: Yes  Focus of Visit: Monitoring  Diabetes type: Type 2  Disease course:  "Stable  How confident are you filling out medical forms by yourself:: Not Assessed  Diabetes management related comments/concerns: CGM no longer covered by insurance, $215/month. Doesn't want to test with glucometer but can't afford CGM.  Transportation concerns: No  Difficulty affording diabetes medication?: No  Difficulty affording diabetes testing supplies?: Sometimes  Other concerns:: English as a second language  Cultural Influences/Ethnic Background:  Not  or     Diabetes Symptoms & Complications:  Diabetes Related Symptoms: Neuropathy  Weight trend: Stable  Symptom course: Stable  Disease course: Stable  Complications assessed today?: No    Patient Problem List and Family Medical History reviewed for relevant medical history, current medical status, and diabetes risk factors.    Vitals:  There were no vitals taken for this visit.  Estimated body mass index is 23.05 kg/m  as calculated from the following:    Height as of 6/13/24: 1.524 m (5').    Weight as of 6/13/24: 53.5 kg (118 lb).   Last 3 BP:   BP Readings from Last 3 Encounters:   06/13/24 137/77   03/25/24 135/87   02/09/24 134/76       History   Smoking Status     Never   Smokeless Tobacco     Never       Labs:  Lab Results   Component Value Date    A1C 7.1 03/21/2024     Lab Results   Component Value Date     03/21/2024     09/07/2022    GLC 99 12/27/2012     Lab Results   Component Value Date     02/09/2024     Direct Measure HDL   Date Value Ref Range Status   02/09/2024 48 (L) >=50 mg/dL Final   ]  GFR Estimate   Date Value Ref Range Status   03/21/2024 >90 >60 mL/min/1.73m2 Final   12/27/2012 >90 >60 mL/min/1.7m2 Final     GFR Estimate If Black   Date Value Ref Range Status   12/27/2012 >90 >60 mL/min/1.7m2 Final     Lab Results   Component Value Date    CR 0.58 03/21/2024    CR 0.62 12/27/2012     No results found for: \"MICROALBUMIN\"    Healthy Eating:  Healthy Eating Assessed Today: Yes  Cultural/Religion " diet restrictions?: No  Do you have any food allergies or intolerances?: No  Meal planning/habits: Low carb, Smaller portions, Avoiding sweets  Who cooks/prepares meals for you?: Self  Who purchases food in  your home?: Self  How many times a week on average do you eat food made away from home (restaurant/take-out)?: 3  Meals include: Breakfast, Lunch, Dinner  Other: Eats lots of veggies, low carb, no bread, avoid sweets, feels she has a lot of self control with her diet  Beverages: Tea, Coffee  Has patient met with a dietitian in the past?: Yes    Being Active:  Being Active Assessed Today: Yes  Exercise:: Yes  Days per week of moderate to strenuous exercise (like a brisk walk): 7  On average, minutes per day of exercise at this level: 30  How intense was your typical exercise? : Moderate (like brisk walking)  Exercise Minutes per Week: 210  Barrier to exercise: None    Monitoring:  Monitoring Assessed Today: Yes  Did patient bring glucose meter to appointment? : No  Times checking blood sugar at home (number): Never    NOT ASSESSED     Taking Medications:  Diabetes Medication(s)       Sulfonylureas       glipiZIDE (GLUCOTROL XL) 5 MG 24 hr tablet Take 1 tablet (5 mg) by mouth daily       Antidiabetic Combinations       JENTADUETO 2.5-1000 MG per tablet TAKE 1 TABLET BY MOUTH TWICE A DAY WITH MEALS            Taking Medication Assessed Today: No  Current Treatments: Oral Medication (taken by mouth)  Problems taking diabetes medications regularly?: No  Diabetes medication side effects?: Yes    Problem Solving:  Problem Solving Assessed Today: No              Reducing Risks:  Reducing Risks Assessed Today: No    Healthy Coping:  Healthy Coping Assessed Today: Yes  Emotional response to diabetes: Ready to learn, Concern for health and well-being  Informal Support system:: Spouse  Stage of change: MAINTENANCE (Working to maintain change, with risk of relapse)  Support resources: None  Patient Activation Measure Survey  Score:       No data to display                  Care Plan and Education Provided:  Monitoring: Blood glucose versus Continuous Glucose Monitoring, Frequency of monitoring, Individual glucose targets, Log and interpret results, and Purpose, and Reducing Risks: Goal for A1c, how it relates to glucose and how often to check    Andreia Seay RD, LD, [facility] [place]     Time Spent: 30 minutes  Encounter Type: Individual    Any diabetes medication dose changes were made via the CDE Protocol per the patient's referring provider. A copy of this encounter was shared with the provider.

## 2024-07-02 NOTE — PROGRESS NOTES
Diabetes Self-Management Education & Support    Presents for: Follow-up    Type of Service: In Person Visit      ASSESSMENT:  Darcy had been using a Dexcom CGMS but this is no longer well covered by her insurance and she cannot afford this. She is disappointed that she'll  need to go back to finger pokes but is willing to do this until over the counter CGMSs are on the market. We reviewed testing schedule, meter options, as well as occasional CGM use to spread the cost over several months. She is agreeable to doing this.    Patient's most recent   Lab Results   Component Value Date    A1C 7.1 03/21/2024     is not meeting goal of <7.0    Diabetes knowledge and skills assessment:   Patient is knowledgeable in diabetes management concepts related to: Healthy Eating, Being Active, Monitoring, Taking Medication, and Reducing Risks    Continue education with the following diabetes management concepts: Healthy Coping    Based on learning assessment above, most appropriate setting for further diabetes education would be: Group class or Individual setting.      PLAN    Check blood sugars 1 time daily, orders placed for meter & supplies  Consider occasional use of Dexcom G7 to decrease monthly cost  Get A1C rechecked, orders placed per CDCES policy     Topics to cover at upcoming visits: Healthy Coping    Follow-up: 10/2/24    See Care Plan for co-developed, patient-state behavior change goals.  AVS provided for patient today.    Education Materials Provided:  BG Log Sheet      SUBJECTIVE/OBJECTIVE:  Presents for: Follow-up  Accompanied by: Self  Diabetes education in the past 24mo: Yes  Focus of Visit: Monitoring  Diabetes type: Type 2  Disease course: Stable  How confident are you filling out medical forms by yourself:: Not Assessed  Diabetes management related comments/concerns: CGM no longer covered by insurance, $215/month. Doesn't want to test with glucometer but can't afford CGM.  Transportation concerns:  "No  Difficulty affording diabetes medication?: No  Difficulty affording diabetes testing supplies?: Sometimes  Other concerns:: English as a second language  Cultural Influences/Ethnic Background:  Not  or     Diabetes Symptoms & Complications:  Diabetes Related Symptoms: Neuropathy  Weight trend: Stable  Symptom course: Stable  Disease course: Stable  Complications assessed today?: No    Patient Problem List and Family Medical History reviewed for relevant medical history, current medical status, and diabetes risk factors.    Vitals:  There were no vitals taken for this visit.  Estimated body mass index is 23.05 kg/m  as calculated from the following:    Height as of 6/13/24: 1.524 m (5').    Weight as of 6/13/24: 53.5 kg (118 lb).   Last 3 BP:   BP Readings from Last 3 Encounters:   06/13/24 137/77   03/25/24 135/87   02/09/24 134/76       History   Smoking Status    Never   Smokeless Tobacco    Never       Labs:  Lab Results   Component Value Date    A1C 7.1 03/21/2024     Lab Results   Component Value Date     03/21/2024     09/07/2022    GLC 99 12/27/2012     Lab Results   Component Value Date     02/09/2024     Direct Measure HDL   Date Value Ref Range Status   02/09/2024 48 (L) >=50 mg/dL Final   ]  GFR Estimate   Date Value Ref Range Status   03/21/2024 >90 >60 mL/min/1.73m2 Final   12/27/2012 >90 >60 mL/min/1.7m2 Final     GFR Estimate If Black   Date Value Ref Range Status   12/27/2012 >90 >60 mL/min/1.7m2 Final     Lab Results   Component Value Date    CR 0.58 03/21/2024    CR 0.62 12/27/2012     No results found for: \"MICROALBUMIN\"    Healthy Eating:  Healthy Eating Assessed Today: Yes  Cultural/Sikh diet restrictions?: No  Do you have any food allergies or intolerances?: No  Meal planning/habits: Low carb, Smaller portions, Avoiding sweets  Who cooks/prepares meals for you?: Self  Who purchases food in  your home?: Self  How many times a week on average do you eat " food made away from home (restaurant/take-out)?: 3  Meals include: Breakfast, Lunch, Dinner  Other: Eats lots of veggies, low carb, no bread, avoid sweets, feels she has a lot of self control with her diet  Beverages: Tea, Coffee  Has patient met with a dietitian in the past?: Yes    Being Active:  Being Active Assessed Today: Yes  Exercise:: Yes  Days per week of moderate to strenuous exercise (like a brisk walk): 7  On average, minutes per day of exercise at this level: 30  How intense was your typical exercise? : Moderate (like brisk walking)  Exercise Minutes per Week: 210  Barrier to exercise: None    Monitoring:  Monitoring Assessed Today: Yes  Did patient bring glucose meter to appointment? : No  Times checking blood sugar at home (number): Never    NOT ASSESSED     Taking Medications:  Diabetes Medication(s)       Sulfonylureas       glipiZIDE (GLUCOTROL XL) 5 MG 24 hr tablet Take 1 tablet (5 mg) by mouth daily       Antidiabetic Combinations       JENTADUETO 2.5-1000 MG per tablet TAKE 1 TABLET BY MOUTH TWICE A DAY WITH MEALS            Taking Medication Assessed Today: No  Current Treatments: Oral Medication (taken by mouth)  Problems taking diabetes medications regularly?: No  Diabetes medication side effects?: Yes    Problem Solving:  Problem Solving Assessed Today: No              Reducing Risks:  Reducing Risks Assessed Today: No    Healthy Coping:  Healthy Coping Assessed Today: Yes  Emotional response to diabetes: Ready to learn, Concern for health and well-being  Informal Support system:: Spouse  Stage of change: MAINTENANCE (Working to maintain change, with risk of relapse)  Support resources: None  Patient Activation Measure Survey Score:       No data to display                  Care Plan and Education Provided:  Monitoring: Blood glucose versus Continuous Glucose Monitoring, Frequency of monitoring, Individual glucose targets, Log and interpret results, and Purpose, and Reducing Risks: Goal for  A1c, how it relates to glucose and how often to check    Andreia Seay RD, LD, [facility]     Time Spent: 30 minutes  Encounter Type: Individual    Any diabetes medication dose changes were made via the CDE Protocol per the patient's referring provider. A copy of this encounter was shared with the provider.

## 2024-07-06 ENCOUNTER — LAB (OUTPATIENT)
Dept: LAB | Facility: CLINIC | Age: 64
End: 2024-07-06
Payer: COMMERCIAL

## 2024-07-06 DIAGNOSIS — E11.9 TYPE 2 DIABETES MELLITUS WITHOUT COMPLICATION, WITHOUT LONG-TERM CURRENT USE OF INSULIN (H): ICD-10-CM

## 2024-07-06 DIAGNOSIS — R25.2 MUSCLE CRAMP, NOCTURNAL: ICD-10-CM

## 2024-07-06 LAB
CK SERPL-CCNC: 66 U/L (ref 26–192)
HBA1C MFR BLD: 5.9 % (ref 0–5.6)

## 2024-07-06 PROCEDURE — 36415 COLL VENOUS BLD VENIPUNCTURE: CPT

## 2024-07-06 PROCEDURE — 82550 ASSAY OF CK (CPK): CPT

## 2024-07-06 PROCEDURE — 83036 HEMOGLOBIN GLYCOSYLATED A1C: CPT

## 2024-07-15 DIAGNOSIS — E11.9 TYPE 2 DIABETES MELLITUS WITHOUT COMPLICATION, WITHOUT LONG-TERM CURRENT USE OF INSULIN (H): ICD-10-CM

## 2024-07-15 RX ORDER — GLIPIZIDE 5 MG/1
5 TABLET, FILM COATED, EXTENDED RELEASE ORAL DAILY
Qty: 90 TABLET | Refills: 1 | OUTPATIENT
Start: 2024-07-15

## 2024-07-15 NOTE — TELEPHONE ENCOUNTER
Requested Prescriptions   Pending Prescriptions Disp Refills    glipiZIDE (GLUCOTROL XL) 5 MG 24 hr tablet [Pharmacy Med Name: GLIPIZIDE ER 5 MG TABLET] 90 tablet 1     Sig: TAKE 1 TABLET BY MOUTH EVERY DAY       Sulfonylurea Agents Passed - 7/15/2024 12:32 AM        Passed - Patient has documented A1c within the specified period of time.     If HgbA1C is 8 or greater, it needs to be on file within the past 3 months.  If less than 8, must be on file within the past 6 months.     Recent Labs   Lab Test 07/06/24  1057   A1C 5.9*             Passed - Medication is active on med list        Passed - Has GFR on file in past 12 months and most recent value is normal        Passed - Recent (6 mo) or future (90 days) visit within the authorizing provider's specialty     The patient must have completed an in-person or virtual visit within the past 6 months or has a future visit scheduled within the next 90 days with the authorizing provider s specialty.  Urgent care and e-visits do not quality as an office visit for this protocol.          Passed - Medication indicated for associated diagnosis     Medication is associated with one or more of the following diagnoses:  Maturity-onset diabetes of the young   Peripheral circulatory disorder due to type 2 diabetes mellitus   Type 2 diabetes mellitus           Passed - Patient is age 18 or older        Passed - No active pregnancy on record        Passed - Patient has a recent creatinine (normal) within the past 12 mos.     Recent Labs   Lab Test 03/21/24  0916   CR 0.58                 Passed - Patient has not had a positive pregnancy test within the past 12 mos.           Last Written Prescription Date:  3/25/24  Last Fill Quantity: 30 tab,  # refills: 5   Last office visit: 3/25/2024 ; last virtual visit: Visit date not found with prescribing provider:  Dr Jane   Future Office Visit:  7/16/24    Pt has enough supply to make appointment 7/16/24. Refill declined.    Hudson DOMÍNGUEZ  SHEYLA Fall

## 2024-07-16 ENCOUNTER — OFFICE VISIT (OUTPATIENT)
Dept: ENDOCRINOLOGY | Facility: CLINIC | Age: 64
End: 2024-07-16
Payer: COMMERCIAL

## 2024-07-16 VITALS
HEART RATE: 80 BPM | DIASTOLIC BLOOD PRESSURE: 87 MMHG | WEIGHT: 116.9 LBS | BODY MASS INDEX: 22.83 KG/M2 | SYSTOLIC BLOOD PRESSURE: 155 MMHG

## 2024-07-16 DIAGNOSIS — E11.40 TYPE 2 DIABETES MELLITUS WITH DIABETIC NEUROPATHY, WITHOUT LONG-TERM CURRENT USE OF INSULIN (H): Primary | ICD-10-CM

## 2024-07-16 PROCEDURE — G2211 COMPLEX E/M VISIT ADD ON: HCPCS | Performed by: INTERNAL MEDICINE

## 2024-07-16 PROCEDURE — 99214 OFFICE O/P EST MOD 30 MIN: CPT | Performed by: INTERNAL MEDICINE

## 2024-07-16 PROCEDURE — 95251 CONT GLUC MNTR ANALYSIS I&R: CPT | Performed by: INTERNAL MEDICINE

## 2024-07-16 RX ORDER — GABAPENTIN 100 MG/1
CAPSULE ORAL
Qty: 60 CAPSULE | Refills: 3 | Status: SHIPPED | OUTPATIENT
Start: 2024-07-16

## 2024-07-16 NOTE — PROGRESS NOTES
Recent issues:   Diabetes follow-up evaluation.  Symptoms of head itching and upset stomach in 1/2024, decided to stop glimeparide  Now taking the Jentadueto and glipizideER, tolerating well  DexcomG7 CGM not covered by insurance, so she's transitioning to BGM use in future        2017. Diagnosis of diabetes mellitus  Medical evaluations with Cedric TOM/Doc  Initial treatment with metformin mediation    9/1/21. Initial diabetes evaluation with me at [facility]  Reviewed health history and diabetes issues  Taking metformin 500 mg BID  Advised change to JanumetXR 100/1000 every day, then CDE eval and diagnostic Felecia  1/2023. Med change to Jardiance per PCP Dr. Parry, but significant symptoms after 1st dose and stopped it  Subsequent change from JanumetXR to Jentadueto medication  Stopped glimeparide due to concern for head itching and head bumps    Current DM medications:  Jentadueto 2.5/1000 1-tab in morning and evening  glipizideER 5 mg 1-tab in morning  .  Blood glucose (BG) meter One Touch   Previously testing BID    2023. Changed from DexcomG6 to G7  Recent DexcomG7 data:          FamHx DM: Mother  Previous [facility] labs include:        Previous [facility] hgbA1c trends include:  Lab Results   Component Value Date    A1C 5.9 (H) 07/06/2024    A1C 7.1 (H) 03/21/2024    A1C 6.5 (H) 02/09/2024    A1C 5.4 10/19/2023    A1C 7.3 (H) 05/09/2023        Recent [facility] labs include:  Lab Results   Component Value Date    A1C 5.9 (H) 07/06/2024     03/21/2024    POTASSIUM 5.2 03/21/2024    CHLORIDE 101 03/21/2024    CO2 26 03/21/2024    ANIONGAP 13 03/21/2024     (H) 03/21/2024    BUN 13.1 03/21/2024    CR 0.58 03/21/2024    GFRESTIMATED >90 03/21/2024    GFRESTBLACK >90 12/27/2012    ARIEL 9.9 03/21/2024    CPEPT 1.8 06/15/2022    CHOL 205 (H) 02/09/2024    TRIG 243 (H) 02/09/2024    HDL 48 (L) 02/09/2024     (H) 02/09/2024    NHDL 157 (H) 02/09/2024    UCRR 148.0 10/19/2023    MICROL 12.0 10/19/2023     UMALCR 8.11 10/19/2023    TSH 0.75 03/21/2024     Last eye exam 11/2023 at [facility], no DR CHENEY Complications:    Neuropathy:    Tingling of feet      Lives in [place], 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 Harshal Parry/Doc for general medicine evaluations.  Also sees Dr. Mague Zavala/Allergist, Dr. Misael Murphy/rheumatology     PMH/PSH:  Past Medical History:   Diagnosis Date    Achilles tendon pain     previous right achilles surgery    Allergies     Cervical pain (neck)     epidural    Chronic back pain     HTN (hypertension)     Hypertriglyceridemia     Near syncope     Osteoarthritis     Osteopenia     Type 2 diabetes mellitus without complication (H)      Past Surgical History:   Procedure Laterality Date    EXCISE CYST BAKER'S      LAPAROTOMY, TUBAL LIGATION, COMBINED      REMOVE IMPLANT BREAST         Family Hx:  Family History   Problem Relation Age of Onset    Diabetes Mother     Dementia Mother     Hypertension Father     Lung Cancer Father     Ovarian Cancer Sister     Breast Cancer No family hx of     Colon Cancer No family hx of          Social Hx:  Social History     Socioeconomic History    Marital status:      Spouse name: Not on file    Number of children: Not on file    Years of education: Not on file    Highest education level: Not on file   Occupational History    Not on file   Tobacco Use    Smoking status: Never    Smokeless tobacco: Never   Vaping Use    Vaping status: Never Used   Substance and Sexual Activity    Alcohol use: Never    Drug use: Never    Sexual activity: Yes     Partners: Male   Other Topics Concern    Not on file   Social History Narrative    Not on file     Social Determinants of Health     Financial Resource Strain: Low Risk  (2/9/2024)    Financial Resource Strain     Within the past 12 months, have you or your family members you live with been unable to get utilities (heat, electricity) when it was really needed?: No   Food Insecurity:  Low Risk  (2/9/2024)    Food Insecurity     Within the past 12 months, did you worry that your food would run out before you got money to buy more?: No     Within the past 12 months, did the food you bought just not last and you didn t have money to get more?: No   Transportation Needs: Low Risk  (2/9/2024)    Transportation Needs     Within the past 12 months, has lack of transportation kept you from medical appointments, getting your medicines, non-medical meetings or appointments, work, or from getting things that you need?: No   Physical Activity: Unknown (2/9/2024)    Exercise Vital Sign     Days of Exercise per Week: 6 days     Minutes of Exercise per Session: Not on file   Stress: No Stress Concern Present (2/9/2024)    [employer] [place] [employer] - Occupational Stress Questionnaire     Feeling of Stress : Only a little   Social Connections: Unknown (2/9/2024)    Social Connection and Isolation Panel [NHANES]     Frequency of Communication with Friends and Family: Not on file     Frequency of Social Gatherings with Friends and Family: More than three times a week     Attends Bahai Services: Not on file     Active Member of Clubs or Organizations: Not on file     Attends Club or Organization Meetings: Not on file     Marital Status: Not on file   Interpersonal Safety: Not on file   Housing Stability: Low Risk  (2/9/2024)    Housing Stability     Do you have housing? : Yes     Are you worried about losing your housing?: No          MEDICATIONS:  has a current medication list which includes the following prescription(s): dexcom g7 , dexcom g7 sensor, gabapentin, glipizide, ibuprofen, jentadueto, pyridoxine hcl, blood glucose, blood glucose monitoring, simethicone, and thin.    ROS:     ROS: 10 point ROS neg other than the symptoms noted above in the HPI.    GENERAL: mild fatigue, wt stable; denies fevers, chills, night sweats.   HEENT: no dysphagia, odonophagia, diplopia, neck  pain  THYROID:  no apparent hyper or hypothyroid symptoms  CV: no chest pain, pressure, palpitations  LUNGS: no SOB, MILLER, cough, wheezing   ABDOMEN: no diarrhea, constipation, abdominal pain  EXTREMITIES: no rashes, ulcers, edema  NEUROLOGY: no headaches, denies changes in vision, tingling, extremitiy numbness   MSK: less right shoulder pain with ROM; denies muscle weakness  SKIN: no rashes or lesions  :  no menses since age 53  PSYCH:  stable mood, no significant anxiety or depression  ENDOCRINE: no heat or cold intolerance    Physical Exam   VS: BP (!) 155/87   Pulse 80   Wt 53 kg (116 lb 14.4 oz)   BMI 22.83 kg/m    GENERAL: AXOX3, NAD, short stature, well dressed, answering questions appropriately, appears stated age.  THYROID:  normal gland, no apparent nodules or goiter  HEENT: neck non-tender, no exopthalmous, no proptosis, EOMI  LUNGS: no audible wheeze, cough or visible cyanosis, no visible retractions or increased work of breathing  ABDOMEN: normal size, soft, nondistended  EXTREMITIES: normal appearance of feet, pulses R/L DP 4/4; no pedal edema  NEUROLOGY: CN grossly intact, no tremors  MSK: grossly intact  SKIN: darker skin complexion; no rashes, no lesions    LABS:    All pertinent notes, labs, and images personally reviewed by me.     A/P:  Encounter Diagnosis   Name Primary?    Type 2 diabetes mellitus with diabetic neuropathy, without long-term current use of insulin (H) Yes     Comments:  Reviewed health history and diabetes issues.  Recent CGM glucose trends excellent  Feet tingling c/w diabetic peripheral neuropathy  Reviewed and interpreted tests that I previously ordered.   Ordered appropriate tests for the endocrinology disease management.    Management options discussed and implemented after shared medical decision making with the patient.  Diabetes problem is chronic with exacerbation progression, hyperglycemia    Plan:  Discussed general issues with the diabetes diagnosis and  management  We discussed the hgbA1c test which reflects previous overall glucose levels or control  Discussed the importance of blood glucose (BG) testing to assess glucose trends  Provided general overview of the diabetes medication options and medication treatment plan  Reviewed recent DexcomG7 CGM glucose trend data, in detail    Recommend:  Continue current Jentadueto and glipizideER medications  Monitor for symptom changes  Avoid the Jardiance med... she didn't tolerate it after PCP Rx  Try gabapentin 100 mg as 1-2 tablets at bedtime, monitor for feet tingling  Keep focus on diet, exercise, and weight management..  Goal target FBG  mg/dl  Continue use of DexcomG7 CGM until gone, then transition to BGM meter   Test fasting BGM 2-days per week, goal  mg/dL  No labs ordered at this time  Advise having fasting lipid panel testing and dilated eye examination, at least annually    Addressed patient questions today    The longitudinal plan of care for the endocrine problem(s) were addressed during this visit.  Due to added complexity of care,   we will continue to support the patient and the subsequent management of this condition with ongoing continuity of care.    There are no Patient Instructions on file for this visit.    Future labs ordered today:   No orders of the defined types were placed in this encounter.    Radiology/Consults ordered today: None    Total time spent on day of encounter:  17 min    Follow-up:  10/23/24 at 8:30am, Return    THERESA España MD, MS  Endocrinology  [facility]    CC:  MANISHA Gan

## 2024-07-19 DIAGNOSIS — E11.9 TYPE 2 DIABETES MELLITUS WITHOUT COMPLICATION, WITHOUT LONG-TERM CURRENT USE OF INSULIN (H): ICD-10-CM

## 2024-07-22 RX ORDER — GLIPIZIDE 5 MG/1
5 TABLET, FILM COATED, EXTENDED RELEASE ORAL DAILY
Qty: 90 TABLET | Refills: 1 | Status: SHIPPED | OUTPATIENT
Start: 2024-07-22

## 2024-07-22 NOTE — TELEPHONE ENCOUNTER
Last Written Prescription Date:  3/25/24  Last Fill Quantity: 30,  # refills: 5   Last office visit: 7/16/2024 ; last virtual visit: Visit date not found with prescribing provider:  Dr. España   Future Office Visit: 10/23/24    Requested Prescriptions   Pending Prescriptions Disp Refills    glipiZIDE (GLUCOTROL XL) 5 MG 24 hr tablet [Pharmacy Med Name: GLIPIZIDE ER 5 MG TABLET] 90 tablet 1     Sig: TAKE 1 TABLET BY MOUTH EVERY DAY       Sulfonylurea Agents Passed - 7/19/2024  7:27 PM        Passed - Patient has documented A1c within the specified period of time.     If HgbA1C is 8 or greater, it needs to be on file within the past 3 months.  If less than 8, must be on file within the past 6 months.     Recent Labs   Lab Test 07/06/24  1057   A1C 5.9*             Passed - Medication is active on med list        Passed - Has GFR on file in past 12 months and most recent value is normal        Passed - Recent (6 mo) or future (90 days) visit within the authorizing provider's specialty     The patient must have completed an in-person or virtual visit within the past 6 months or has a future visit scheduled within the next 90 days with the authorizing provider s specialty.  Urgent care and e-visits do not quality as an office visit for this protocol.          Passed - Medication indicated for associated diagnosis     Medication is associated with one or more of the following diagnoses:  Maturity-onset diabetes of the young   Peripheral circulatory disorder due to type 2 diabetes mellitus   Type 2 diabetes mellitus           Passed - Patient is age 18 or older        Passed - No active pregnancy on record        Passed - Patient has a recent creatinine (normal) within the past 12 mos.     Recent Labs   Lab Test 03/21/24  0916   CR 0.58                 Passed - Patient has not had a positive pregnancy test within the past 12 mos.           Refills sent  Yvonne Holliday RN

## 2024-08-26 DIAGNOSIS — E11.9 TYPE 2 DIABETES MELLITUS WITHOUT COMPLICATION, WITHOUT LONG-TERM CURRENT USE OF INSULIN (H): ICD-10-CM

## 2024-08-26 RX ORDER — LINAGLIPTIN AND METFORMIN HYDROCHLORIDE 2.5; 1 MG/1; MG/1
1 TABLET, FILM COATED ORAL 2 TIMES DAILY WITH MEALS
Qty: 180 TABLET | Refills: 0 | Status: SHIPPED | OUTPATIENT
Start: 2024-08-26

## 2024-08-26 NOTE — TELEPHONE ENCOUNTER
"Last Written Prescription Date:  4/22/24  Last Fill Quantity: 180,  # refills: 1   Last office visit: 7/16/2024 ; last virtual visit: Visit date not found with prescribing provider:  Dr. España   Future Office Visit: 10/23/24  Next 5 appointments (look out 90 days)      Oct 15, 2024 10:00 AM  (Arrive by 9:40 AM)  Provider Visit with Waldemar Yates PA-C  [facility] [address], [address]  [facility] [id number] [phone]             Requested Prescriptions   Pending Prescriptions Disp Refills    linagliptin-metFORMIN (JENTADUETO) 2.5-1000 MG per tablet [Pharmacy Med Name: JENTADUETO 2.5 MG-1000 MG TAB] 180 tablet 0     Sig: TAKE 1 TABLET BY MOUTH TWICE A DAY WITH MEALS       Combination Oral Antihyperglycemic Agents Passed - 8/26/2024  9:34 AM        Passed - Patient has documented A1c within the specified period of time.     If HgbA1C is 8 or greater, it needs to be on file within the past 3 months.  If less than 8, must be on file within the past 6 months.     Recent Labs   Lab Test 07/06/24  1057   A1C 5.9*             Passed - Patient's CR is NOT>1.4 OR Patient's EGFR is NOT<45 within past 12 mos.     Recent Labs   Lab Test 03/21/24  0916   GFRESTIMATED >90       Recent Labs   Lab Test 03/21/24  0916   CR 0.58             Passed - Patient does not have a diagnosis of CHF.        Passed - Medication is active on med list        Passed - Patient is 18 years old or older.        Passed - Patient is not pregnant        Passed - Patient has not had a positive pregnancy test within the past 12 mos.        Passed - Recent (6 mo) or future (30 days) visit within the authorizing provider's specialty     Patient had office visit in the last 6 months or has a visit in the next 30 days with authorizing provider or within the authorizing provider's specialty.  See \"Patient Info\" tab in inbasket, or \"Choose Columns\" in Meds & Orders section of the refill " encounter.               Refill sent  Yvonne Holliday RN

## 2024-09-30 ENCOUNTER — TELEPHONE (OUTPATIENT)
Dept: ENDOCRINOLOGY | Facility: CLINIC | Age: 64
End: 2024-09-30
Payer: COMMERCIAL

## 2024-09-30 DIAGNOSIS — E11.40 TYPE 2 DIABETES MELLITUS WITH DIABETIC NEUROPATHY, WITHOUT LONG-TERM CURRENT USE OF INSULIN (H): Primary | ICD-10-CM

## 2024-09-30 NOTE — TELEPHONE ENCOUNTER
[facility] Call Center    Phone Message    May a detailed message be left on voicemail: yes     Reason for Call: Order(s): Other:   Reason for requested: Needs lab orders entered so she can schedule labs before her appt with Dr España, please pepito once placed so she can schedule.    Date needed: 09/30/2024  Provider name: Taco    Action Taken: Message routed to:  [facility] Endo    Travel Screening: Not Applicable     Date of Service:

## 2024-10-02 NOTE — TELEPHONE ENCOUNTER
Message reviewed, preappt lab orders signed.  Please relay message to patient, thanks.    THERESA España MD, MS  Endocrinology  [facility]

## 2024-10-15 ENCOUNTER — OFFICE VISIT (OUTPATIENT)
Dept: FAMILY MEDICINE | Facility: CLINIC | Age: 64
End: 2024-10-15
Payer: COMMERCIAL

## 2024-10-15 VITALS
RESPIRATION RATE: 16 BRPM | TEMPERATURE: 98 F | OXYGEN SATURATION: 99 % | HEIGHT: 60 IN | WEIGHT: 115 LBS | BODY MASS INDEX: 22.58 KG/M2 | HEART RATE: 83 BPM | SYSTOLIC BLOOD PRESSURE: 133 MMHG | DIASTOLIC BLOOD PRESSURE: 84 MMHG

## 2024-10-15 DIAGNOSIS — E11.40 TYPE 2 DIABETES MELLITUS WITH DIABETIC NEUROPATHY, WITHOUT LONG-TERM CURRENT USE OF INSULIN (H): ICD-10-CM

## 2024-10-15 DIAGNOSIS — M25.561 ACUTE PAIN OF BOTH KNEES: Primary | ICD-10-CM

## 2024-10-15 DIAGNOSIS — M25.562 ACUTE PAIN OF BOTH KNEES: Primary | ICD-10-CM

## 2024-10-15 DIAGNOSIS — L29.9 SCALP PRURITUS: ICD-10-CM

## 2024-10-15 PROCEDURE — 99214 OFFICE O/P EST MOD 30 MIN: CPT | Mod: 25 | Performed by: PHYSICIAN ASSISTANT

## 2024-10-15 PROCEDURE — 90480 ADMN SARSCOV2 VAC 1/ONLY CMP: CPT | Performed by: PHYSICIAN ASSISTANT

## 2024-10-15 PROCEDURE — G0008 ADMIN INFLUENZA VIRUS VAC: HCPCS | Performed by: PHYSICIAN ASSISTANT

## 2024-10-15 PROCEDURE — 90662 IIV NO PRSV INCREASED AG IM: CPT | Performed by: PHYSICIAN ASSISTANT

## 2024-10-15 PROCEDURE — 91320 SARSCV2 VAC 30MCG TRS-SUC IM: CPT | Performed by: PHYSICIAN ASSISTANT

## 2024-10-15 NOTE — PATIENT INSTRUCTIONS
-Stop Glipizide due to potential sulfa allergy relation after labs tomorrow. Check in with Dr. España about alternative options.     -Voltaren cream for knees. 3-4 times a day as needed.

## 2024-10-15 NOTE — PROGRESS NOTES
"Assessment & Plan     Acute pain of both knees  Continue close follow-up with [facility] + PT. Option to add Tylenol/Voltaren cream.  Avoidance of exacerbating activities such as squatting.    Type 2 diabetes mellitus with diabetic neuropathy, without long-term current use of insulin (H)  Scalp pruritus  Upcoming appointment with endocrinology.  Lab appointment scheduled for tomorrow.  After completion of lab appointment recommended discontinuation of glipizide leading up to appointment with endocrinology.  Potential cross-reactivity between sulfa antibiotic allergy and sulfonylurea discussed.  If this improves her symptoms could consider different diabetic drug if A1c not at goal.    30 minutes spent by me on the date of the encounter on chart review, review of test results, interpretation of tests, patient visit, and documentation    Flu/COVID shot today     The longitudinal plan of care for the diagnosis(es)/condition(s) as documented were addressed during this visit. Due to the added complexity in care, I will continue to support Darcy in the subsequent management and with ongoing continuity of care.    No follow-ups on file.    The likelihood of other entities in the differential is insufficient to justify any further testing for them at this time. This was explained to the patient. The patient was advised that persistent or worsening symptoms would require further evaluation. Patient advised to call the office and if unable to reach to go to the emergency room if they develop any new or worsening symptoms. Expressed understanding and agreement with above stated plan.     FRANCISCA Ernandez [facility] CARLEY    Susie Taveras KALANI Story is a very pleasant 64 year old female presenting for the following health issues:  Patient presents with:  NEUROPATHY: Follow up  Knee Pain  Recheck Medication: After starting glipizide patient noticed her head become \"really\" itchy     -History of type 2 " "diabetes with neuropathy.  Currently taking Jentadueto + glipizide.  Last A1c 5.9%.  Notes itchiness of her scalp/head since initiation of Glipizide. Follows with Dr. España in endocrinology. Follows with [facility] diabetes  every 2 weeks.     Stays very active with recombinant bike. Recently saw [facility] for bilateral knee pain.  Per patient x-rays were normal however they encouraged her to stop squatting. Completing physical therapy.     -History of neuropathy for which she takes gabapentin, B12, magnesium      Review of Systems   Constitutional, HEENT, cardiovascular, pulmonary, GI, , musculoskeletal, neuro, skin, endocrine and psych systems are negative, except as otherwise noted.      Objective    /84 (BP Location: Right arm, Patient Position: Sitting, Cuff Size: Adult Regular)   Pulse 83   Temp 98  F (36.7  C)   Resp 16   Ht 1.524 m (5')   Wt 52.2 kg (115 lb)   SpO2 99%   BMI 22.46 kg/m    5' 0\"  115 lbs 0 oz    EXAM:  GENERAL APPEARANCE: healthy, alert and no distress  EYES: Eyes grossly normal to inspection, PERRL and conjunctivae and sclerae normal  NECK: no asymmetry, masses, or scars  RESP: lungs clear to auscultation - no rales, rhonchi or wheezes  CV: regular rates and rhythm, normal S1 S2, no S3 or S4 and no murmur, click or rub  MS: extremities normal- no gross deformities noted  SKIN: no suspicious lesions or rashes  NEURO: Normal strength and tone, mentation intact and speech normal  PSYCH: mentation appears normal and affect normal      Answers submitted by the patient for this visit:  General Questionnaire (Submitted on 10/15/2024)  Chief Complaint: Chronic problems general questions HPI Form  What is the reason for your visit today? : check up for neuraphaty  How many servings of fruits and vegetables do you eat daily?: 0-1  On average, how many sweetened beverages do you drink each day (Examples: soda, juice, sweet tea, etc.  Do NOT count diet or artificially sweetened beverages)?: " 0  How many minutes a day do you exercise enough to make your heart beat faster?: 10 to 19  How many days a week do you exercise enough to make your heart beat faster?: 7  How many days per week do you miss taking your medication?: 0

## 2024-10-18 ENCOUNTER — LAB (OUTPATIENT)
Dept: LAB | Facility: CLINIC | Age: 64
End: 2024-10-18
Payer: COMMERCIAL

## 2024-10-18 DIAGNOSIS — E11.40 TYPE 2 DIABETES MELLITUS WITH DIABETIC NEUROPATHY, WITHOUT LONG-TERM CURRENT USE OF INSULIN (H): ICD-10-CM

## 2024-10-18 LAB
ALT SERPL W P-5'-P-CCNC: 13 U/L (ref 0–50)
ANION GAP SERPL CALCULATED.3IONS-SCNC: 11 MMOL/L (ref 7–15)
BUN SERPL-MCNC: 15.9 MG/DL (ref 8–23)
CALCIUM SERPL-MCNC: 10 MG/DL (ref 8.8–10.4)
CHLORIDE SERPL-SCNC: 104 MMOL/L (ref 98–107)
CREAT SERPL-MCNC: 0.59 MG/DL (ref 0.51–0.95)
CREAT UR-MCNC: 82 MG/DL
EGFRCR SERPLBLD CKD-EPI 2021: >90 ML/MIN/1.73M2
EST. AVERAGE GLUCOSE BLD GHB EST-MCNC: 123 MG/DL
GLUCOSE SERPL-MCNC: 156 MG/DL (ref 70–99)
HBA1C MFR BLD: 5.9 % (ref 0–5.6)
HCO3 SERPL-SCNC: 25 MMOL/L (ref 22–29)
MICROALBUMIN UR-MCNC: <12 MG/L
MICROALBUMIN/CREAT UR: NORMAL MG/G{CREAT}
POTASSIUM SERPL-SCNC: 5.1 MMOL/L (ref 3.4–5.3)
SODIUM SERPL-SCNC: 140 MMOL/L (ref 135–145)

## 2024-10-18 PROCEDURE — 80048 BASIC METABOLIC PNL TOTAL CA: CPT

## 2024-10-18 PROCEDURE — 83036 HEMOGLOBIN GLYCOSYLATED A1C: CPT

## 2024-10-18 PROCEDURE — 36415 COLL VENOUS BLD VENIPUNCTURE: CPT

## 2024-10-18 PROCEDURE — 84460 ALANINE AMINO (ALT) (SGPT): CPT

## 2024-10-18 PROCEDURE — 82570 ASSAY OF URINE CREATININE: CPT

## 2024-10-18 PROCEDURE — 82043 UR ALBUMIN QUANTITATIVE: CPT

## 2024-10-23 ENCOUNTER — OFFICE VISIT (OUTPATIENT)
Dept: ENDOCRINOLOGY | Facility: CLINIC | Age: 64
End: 2024-10-23
Payer: COMMERCIAL

## 2024-10-23 VITALS
BODY MASS INDEX: 22.38 KG/M2 | WEIGHT: 114.6 LBS | SYSTOLIC BLOOD PRESSURE: 138 MMHG | DIASTOLIC BLOOD PRESSURE: 76 MMHG | HEART RATE: 96 BPM

## 2024-10-23 DIAGNOSIS — E11.40 TYPE 2 DIABETES MELLITUS WITH DIABETIC NEUROPATHY, WITHOUT LONG-TERM CURRENT USE OF INSULIN (H): Primary | ICD-10-CM

## 2024-10-23 DIAGNOSIS — L29.9 SCALP ITCH: ICD-10-CM

## 2024-10-23 PROCEDURE — G2211 COMPLEX E/M VISIT ADD ON: HCPCS | Performed by: INTERNAL MEDICINE

## 2024-10-23 PROCEDURE — 99214 OFFICE O/P EST MOD 30 MIN: CPT | Performed by: INTERNAL MEDICINE

## 2024-10-23 NOTE — PROGRESS NOTES
"Recent issues:   Diabetes follow-up evaluation.  Taking the gabapentin 100 mg 2-capsules at bedtime and reports feet tingling/needle sensation gone  Previous symptoms of head itching and upset stomach in 1/2024, decided to stop glimeparide,  Subsequently tried glipizideER but noticed head/scalp itching and \"bumps\"... but still on the medication        2017. Diagnosis of diabetes mellitus  Medical evaluations with [facility] TOM/Doc  Initial treatment with metformin mediation    9/1/21. Initial diabetes evaluation with me at [place]  Reviewed health history and diabetes issues  Taking metformin 500 mg BID  Advised change to JanumetXR 100/1000 every day, then CDE eval and diagnostic Felecia  1/2023. Med change to Jardiance per PCP Dr. Parry, but significant symptoms after 1st dose and stopped it  Subsequent change from JanumetXR to Jentadueto medication  Stopped glimeparide due to concern for head itching and head bumps  Current DM medications:  Jentadueto 2.5/1000 1-tab in morning and evening  glipizideER 5 mg 1-tab in morning  .  Blood glucose (BG) meter One Touch   Previously testing BID   Recent trends 107-167 mg/dl, avg 134 mg/dL  2023. Changed from DexcomG6 to G7, but then stopped due to cost  FamHx DM: Mother  Previous [facility] labs include:        Previous [facility] hgbA1c trends include:  Lab Results   Component Value Date    A1C 5.9 (H) 10/18/2024    A1C 5.9 (H) 07/06/2024    A1C 7.1 (H) 03/21/2024    A1C 6.5 (H) 02/09/2024    A1C 5.4 10/19/2023        Recent [facility] labs include:  Lab Results   Component Value Date    A1C 5.9 (H) 10/18/2024     10/18/2024    POTASSIUM 5.1 10/18/2024    CHLORIDE 104 10/18/2024    CO2 25 10/18/2024    ANIONGAP 11 10/18/2024     (H) 10/18/2024    BUN 15.9 10/18/2024    CR 0.59 10/18/2024    GFRESTIMATED >90 10/18/2024    GFRESTBLACK >90 12/27/2012    ARIEL 10.0 10/18/2024    CPEPT 1.8 06/15/2022    CHOL 205 (H) 02/09/2024    TRIG 243 (H) 02/09/2024    HDL 48 (L) " 02/09/2024     (H) 02/09/2024    NHDL 157 (H) 02/09/2024    UCRR 82.0 10/18/2024    MICROL <12.0 10/18/2024    UMALCR  10/18/2024      Comment:      Unable to calculate, urine albumin and/or urine creatinine is outside detectable limits.  Microalbuminuria is defined as an albumin:creatinine ratio of 17 to 299 for males and 25 to 299 for females. A ratio of albumin:creatinine of 300 or higher is indicative of overt proteinuria.  Due to biologic variability, positive results should be confirmed by a second, first-morning random or 24-hour timed urine specimen. If there is discrepancy, a third specimen is recommended. When 2 out of 3 results are in the microalbuminuria range, this is evidence for incipient nephropathy and warrants increased efforts at glucose control, blood pressure control, and institution of therapy with an angiotensin-converting-enzyme (ACE) inhibitor (if the patient can tolerate it).      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 Harshal Parry/Doc for general medicine evaluations.  Also sees Dr. Mague Zavala/Allergist, Dr. Misael Murphy/rheumatology     PMH/PSH:  Past Medical History:   Diagnosis Date    Achilles tendon pain     previous right achilles surgery    Allergies     Cervical pain (neck)     epidural    Chronic back pain     HTN (hypertension)     Hypertriglyceridemia     Near syncope     Osteoarthritis     Osteopenia     Type 2 diabetes mellitus without complication (H)      Past Surgical History:   Procedure Laterality Date    EXCISE CYST BAKER'S      LAPAROTOMY, TUBAL LIGATION, COMBINED      REMOVE IMPLANT BREAST         Family Hx:  Family History   Problem Relation Age of Onset    Diabetes Mother     Dementia Mother     Hypertension Father     Lung Cancer Father     Ovarian Cancer Sister     Breast Cancer No family  hx of     Colon Cancer No family hx of          Social Hx:  Social History     Socioeconomic History    Marital status:      Spouse name: Not on file    Number of children: Not on file    Years of education: Not on file    Highest education level: Not on file   Occupational History    Not on file   Tobacco Use    Smoking status: Never    Smokeless tobacco: Never   Vaping Use    Vaping status: Never Used   Substance and Sexual Activity    Alcohol use: Never    Drug use: Never    Sexual activity: Yes     Partners: Male   Other Topics Concern    Not on file   Social History Narrative    Not on file     Social Drivers of Health     Financial Resource Strain: Low Risk  (2/9/2024)    Financial Resource Strain     Within the past 12 months, have you or your family members you live with been unable to get utilities (heat, electricity) when it was really needed?: No   Food Insecurity: Low Risk  (2/9/2024)    Food Insecurity     Within the past 12 months, did you worry that your food would run out before you got money to buy more?: No     Within the past 12 months, did the food you bought just not last and you didn t have money to get more?: No   Transportation Needs: Low Risk  (2/9/2024)    Transportation Needs     Within the past 12 months, has lack of transportation kept you from medical appointments, getting your medicines, non-medical meetings or appointments, work, or from getting things that you need?: No   Physical Activity: Unknown (2/9/2024)    Exercise Vital Sign     Days of Exercise per Week: 6 days     Minutes of Exercise per Session: Not on file   Stress: No Stress Concern Present (2/9/2024)    [employer] [place] [employer] - Occupational Stress Questionnaire     Feeling of Stress : Only a little   Social Connections: Unknown (2/9/2024)    Social Connection and Isolation Panel [NHANES]     Frequency of Communication with Friends and Family: Not on file     Frequency of Social Gatherings with  Friends and Family: More than three times a week     Attends Jew Services: Not on file     Active Member of Clubs or Organizations: Not on file     Attends Club or Organization Meetings: Not on file     Marital Status: Not on file   Interpersonal Safety: Not on file   Housing Stability: Low Risk  (2/9/2024)    Housing Stability     Do you have housing? : Yes     Are you worried about losing your housing?: No          MEDICATIONS:  has a current medication list which includes the following prescription(s): gabapentin, glipizide, ibuprofen, jentadueto, magnesium hydroxide, pyridoxine hcl, blood glucose, blood glucose monitoring, dexcom g7 , dexcom g7 sensor, simethicone, and thin.    ROS:     ROS: 10 point ROS neg other than the symptoms noted above in the HPI.    GENERAL: mild fatigue, wt stable; denies fevers, chills, night sweats.   HEENT: no dysphagia, odonophagia, diplopia, neck pain  THYROID:  no apparent hyper or hypothyroid symptoms  CV: no chest pain, pressure, palpitations  LUNGS: no SOB, MILLER, cough, wheezing   ABDOMEN: no diarrhea, constipation, abdominal pain  EXTREMITIES: no rashes, ulcers, edema  NEUROLOGY: no headaches, denies changes in vision, tingling, extremitiy numbness   MSK: less right shoulder pain with ROM; denies muscle weakness  SKIN: scalp itching as noted; no rashes or lesions  :  no menses since age 53  PSYCH:  stable mood, no significant anxiety or depression  ENDOCRINE: no heat or cold intolerance    Physical Exam   VS: /76   Pulse 96   Wt 52 kg (114 lb 9.6 oz)   BMI 22.38 kg/m    GENERAL: AXOX3, NAD, short stature, well dressed, answering questions appropriately, appears stated age.  THYROID:  normal gland, no apparent nodules or goiter  HEENT: neck non-tender, no exopthalmous, no proptosis, EOMI  LUNGS: no audible wheeze, cough or visible cyanosis, no visible retractions or increased work of breathing  ABDOMEN: normal size, soft, nondistended  EXTREMITIES: feet  not examined  NEUROLOGY: CN grossly intact, no tremors  MSK: grossly intact  SKIN: darker skin complexion; no rashes, no lesions    LABS:    All pertinent notes, labs, and images personally reviewed by me.     A/P:  Encounter Diagnoses   Name Primary?    Type 2 diabetes mellitus with diabetic neuropathy, without long-term current use of insulin (H) Yes    Scalp itch        Comments:  Reviewed health history and diabetes issues.  Scalp itching likely due to glipizideER and sulfa allergy  Reviewed and interpreted tests that I previously ordered.   Ordered appropriate tests for the endocrinology disease management.    Management options discussed and implemented after shared medical decision making with the patient.  Diabetes problem is chronic, stable    Plan:  Discussed general issues with the diabetes diagnosis and management  We discussed the hgbA1c test which reflects previous overall glucose levels or control  Discussed the importance of blood glucose (BG) testing to assess glucose trends  Provided general overview of the diabetes medication options and medication treatment plan    Recommend:  Continue current Jentadueto medication  Stop the glipizideER medication  Monitor for symptom changes, message me in 1-2 weeks  Consider future trial of pioglitazone, discussed  Avoid the Jardiance med... she didn't tolerate it after PCP Rx  Continue the gabapentin 100 mg as 1-2 tablets at bedtime, monitor for feet tingling  Keep focus on diet, exercise, and weight management..  Goal target FBG  mg/dl  No labs ordered at this time  Advise having fasting lipid panel testing and dilated eye examination, at least annually    Addressed patient questions today    The longitudinal plan of care for the endocrine problem(s) were addressed during this visit.  Due to added complexity of care,   we will continue to support the patient and the subsequent management of this condition with ongoing continuity of care.    There are no  Patient Instructions on file for this visit.    Future labs ordered today:   Orders Placed This Encounter   Procedures    Hemoglobin A1c    Basic metabolic panel    ALT     Radiology/Consults ordered today: None    Total time spent on day of encounter:  20 min    Follow-up:  2/17/25 at 10am, Return    THERESA España MD, MS  Endocrinology  [facility]    CC:  CORY Yates

## 2024-11-04 ENCOUNTER — TELEPHONE (OUTPATIENT)
Dept: ENDOCRINOLOGY | Facility: CLINIC | Age: 64
End: 2024-11-04
Payer: COMMERCIAL

## 2024-11-04 NOTE — TELEPHONE ENCOUNTER
[facility] Call Center    Phone Message    May a detailed message be left on voicemail: yes     Reason for Call: Other: patient called to let provider know that she has been fine without her  glipiZIDE and her levels have stayed down at 125 before morning meal and dinner time 175.    Action Taken: Message routed to:  [facility] ([facility]):   endo    Travel Screening: Not Applicable     Date of Service:

## 2024-11-05 NOTE — TELEPHONE ENCOUNTER
Patient update with her BGM information (without glipizide) noted, appreciated.    THERESA España MD, MS  Endocrinology  [facility]

## 2024-12-31 DIAGNOSIS — E11.9 TYPE 2 DIABETES MELLITUS WITHOUT COMPLICATION, WITHOUT LONG-TERM CURRENT USE OF INSULIN (H): ICD-10-CM

## 2024-12-31 RX ORDER — GLIPIZIDE 5 MG/1
5 TABLET, FILM COATED, EXTENDED RELEASE ORAL DAILY
Qty: 90 TABLET | Refills: 1 | OUTPATIENT
Start: 2024-12-31

## 2024-12-31 NOTE — TELEPHONE ENCOUNTER
"Last Written Prescription Date:  7/22/24  Last Fill Quantity: 90,  # refills: 1   Last office visit: 10/23/2024 ; last virtual visit: Visit date not found with prescribing provider:  Taco   Future Office Visit:  2/17/25    Per last office notes, 10/23/24, \"Stop the glipizideER medication.\"    Patient called and she states she is no longer on this medication.      Bruna Rogers RN        "

## 2025-01-06 DIAGNOSIS — E11.9 TYPE 2 DIABETES MELLITUS WITHOUT COMPLICATION, WITHOUT LONG-TERM CURRENT USE OF INSULIN (H): ICD-10-CM

## 2025-01-07 RX ORDER — LINAGLIPTIN AND METFORMIN HYDROCHLORIDE 2.5; 1 MG/1; MG/1
1 TABLET, FILM COATED ORAL 2 TIMES DAILY WITH MEALS
Qty: 180 TABLET | Refills: 0 | Status: SHIPPED | OUTPATIENT
Start: 2025-01-07

## 2025-01-07 NOTE — TELEPHONE ENCOUNTER
"Last Written Prescription Date:  8/6/24  Last Fill Quantity: 180,  # refills: 0   Last office visit: 10/23/2024 ; last virtual visit: Visit date not found with prescribing provider:  Dr. España   Future Office Visit: 2/17/25    Requested Prescriptions   Pending Prescriptions Disp Refills    JENTADUETO 2.5-1000 MG per tablet [Pharmacy Med Name: JENTADUETO 2.5 MG-1000 MG TAB] 180 tablet 0     Sig: TAKE 1 TABLET BY MOUTH TWICE A DAY WITH MEALS       Combination Oral Antihyperglycemic Agents Passed - 1/7/2025 12:14 PM        Passed - Patient has documented A1c within the specified period of time.     If HgbA1C is 8 or greater, it needs to be on file within the past 3 months.  If less than 8, must be on file within the past 6 months.     Recent Labs   Lab Test 10/18/24  0727   A1C 5.9*             Passed - Patient's CR is NOT>1.4 OR Patient's EGFR is NOT<45 within past 12 mos.     Recent Labs   Lab Test 10/18/24  0727   GFRESTIMATED >90       Recent Labs   Lab Test 10/18/24  0727   CR 0.59             Passed - Patient does not have a diagnosis of CHF.        Passed - Medication is active on med list        Passed - Patient is 18 years old or older.        Passed - Patient is not pregnant        Passed - Patient has not had a positive pregnancy test within the past 12 mos.        Passed - Recent (6 mo) or future (30 days) visit within the authorizing provider's specialty     Patient had office visit in the last 6 months or has a visit in the next 30 days with authorizing provider or within the authorizing provider's specialty.  See \"Patient Info\" tab in inbasket, or \"Choose Columns\" in Meds & Orders section of the refill encounter.               Refills sent  Yvonne Holliday RN    "

## 2025-01-26 ENCOUNTER — HEALTH MAINTENANCE LETTER (OUTPATIENT)
Age: 65
End: 2025-01-26

## 2025-02-11 ENCOUNTER — LAB (OUTPATIENT)
Dept: LAB | Facility: CLINIC | Age: 65
End: 2025-02-11
Payer: COMMERCIAL

## 2025-02-11 DIAGNOSIS — E11.40 TYPE 2 DIABETES MELLITUS WITH DIABETIC NEUROPATHY, WITHOUT LONG-TERM CURRENT USE OF INSULIN (H): ICD-10-CM

## 2025-02-11 LAB
ALT SERPL W P-5'-P-CCNC: 14 U/L (ref 0–50)
ANION GAP SERPL CALCULATED.3IONS-SCNC: 16 MMOL/L (ref 7–15)
BUN SERPL-MCNC: 20.1 MG/DL (ref 8–23)
CALCIUM SERPL-MCNC: 9.9 MG/DL (ref 8.8–10.4)
CHLORIDE SERPL-SCNC: 101 MMOL/L (ref 98–107)
CREAT SERPL-MCNC: 0.59 MG/DL (ref 0.51–0.95)
EGFRCR SERPLBLD CKD-EPI 2021: >90 ML/MIN/1.73M2
EST. AVERAGE GLUCOSE BLD GHB EST-MCNC: 157 MG/DL
GLUCOSE SERPL-MCNC: 188 MG/DL (ref 70–99)
HBA1C MFR BLD: 7.1 % (ref 0–5.6)
HCO3 SERPL-SCNC: 21 MMOL/L (ref 22–29)
POTASSIUM SERPL-SCNC: 4.4 MMOL/L (ref 3.4–5.3)
SODIUM SERPL-SCNC: 138 MMOL/L (ref 135–145)

## 2025-02-11 PROCEDURE — 80048 BASIC METABOLIC PNL TOTAL CA: CPT

## 2025-02-11 PROCEDURE — 84460 ALANINE AMINO (ALT) (SGPT): CPT

## 2025-02-11 PROCEDURE — 36415 COLL VENOUS BLD VENIPUNCTURE: CPT

## 2025-02-11 PROCEDURE — 83036 HEMOGLOBIN GLYCOSYLATED A1C: CPT

## 2025-02-15 ENCOUNTER — TRANSFERRED RECORDS (OUTPATIENT)
Dept: MULTI SPECIALTY CLINIC | Facility: CLINIC | Age: 65
End: 2025-02-15

## 2025-02-15 LAB — RETINOPATHY: NORMAL

## 2025-02-17 ENCOUNTER — OFFICE VISIT (OUTPATIENT)
Dept: ENDOCRINOLOGY | Facility: CLINIC | Age: 65
End: 2025-02-17
Payer: COMMERCIAL

## 2025-02-17 VITALS
WEIGHT: 113 LBS | BODY MASS INDEX: 22.07 KG/M2 | HEART RATE: 97 BPM | DIASTOLIC BLOOD PRESSURE: 83 MMHG | SYSTOLIC BLOOD PRESSURE: 144 MMHG

## 2025-02-17 DIAGNOSIS — E11.40 TYPE 2 DIABETES MELLITUS WITH DIABETIC NEUROPATHY, WITHOUT LONG-TERM CURRENT USE OF INSULIN (H): Primary | ICD-10-CM

## 2025-02-17 PROCEDURE — 99214 OFFICE O/P EST MOD 30 MIN: CPT | Performed by: INTERNAL MEDICINE

## 2025-02-17 PROCEDURE — G2211 COMPLEX E/M VISIT ADD ON: HCPCS | Performed by: INTERNAL MEDICINE

## 2025-02-17 NOTE — PROGRESS NOTES
"Recent issues:   Diabetes follow-up evaluation.  Taking the gabapentin 100 mg 2-capsules at bedtime and reports feet tingling/needle sensation gone  Previous symptoms of head itching and upset stomach in 1/2024, decided to stop glimeparide,  Subsequently tried glipizideER but noticed head/scalp itching and \"bumps\"... then med stopped        2017. Diagnosis of diabetes mellitus  Medical evaluations with Cedric TOM/Doc  Initial treatment with metformin mediation    9/1/21. Initial diabetes evaluation with me at [facility]  Reviewed health history and diabetes issues  Taking metformin 500 mg BID  Advised change to JanumetXR 100/1000 every day, then CDE eval and diagnostic Felecia  1/2023. Med change to Jardiance per PCP Dr. Parry, but significant symptoms after 1st dose and stopped it  Subsequent change from JanumetXR to Jentadueto medication  Stopped glimeparide due to concern for head itching and head bumps  Current DM medications:  Jentadueto 2.5/1000 1-tab in morning and evening    Blood glucose (BG) meter One Touch   Previously testing BID   Recent trends 124-205 mg/dl, avg 161 mg/dL  2023. Changed from DexcomG6 to G7, but then stopped due to cost  FamHx DM: Mother  Previous [facility] labs include:        Previous [facility] hgbA1c trends include:  Lab Results   Component Value Date    A1C 7.1 (H) 02/11/2025    A1C 5.9 (H) 10/18/2024    A1C 5.9 (H) 07/06/2024    A1C 7.1 (H) 03/21/2024    A1C 6.5 (H) 02/09/2024        Recent [facility] labs include:  Lab Results   Component Value Date    A1C 7.1 (H) 02/11/2025     02/11/2025    POTASSIUM 4.4 02/11/2025    CHLORIDE 101 02/11/2025    CO2 21 (L) 02/11/2025    ANIONGAP 16 (H) 02/11/2025     (H) 02/11/2025    BUN 20.1 02/11/2025    CR 0.59 02/11/2025    GFRESTIMATED >90 02/11/2025    GFRESTBLACK >90 12/27/2012    ARIEL 9.9 02/11/2025    CPEPT 1.8 06/15/2022    CHOL 205 (H) 02/09/2024    TRIG 243 (H) 02/09/2024    HDL 48 (L) 02/09/2024     (H) 02/09/2024    NHDL " 157 (H) 02/09/2024    UCRR 82.0 10/18/2024    MICROL <12.0 10/18/2024    UMALCR  10/18/2024      Comment:      Unable to calculate, urine albumin and/or urine creatinine is outside detectable limits.  Microalbuminuria is defined as an albumin:creatinine ratio of 17 to 299 for males and 25 to 299 for females. A ratio of albumin:creatinine of 300 or higher is indicative of overt proteinuria.  Due to biologic variability, positive results should be confirmed by a second, first-morning random or 24-hour timed urine specimen. If there is discrepancy, a third specimen is recommended. When 2 out of 3 results are in the microalbuminuria range, this is evidence for incipient nephropathy and warrants increased efforts at glucose control, blood pressure control, and institution of therapy with an angiotensin-converting-enzyme (ACE) inhibitor (if the patient can tolerate it).      TSH 0.75 03/21/2024     Had eye exam with Dr. 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 Harshal Parry/Doc for general medicine evaluations.  Also sees Dr. Mague Zavala/Allergist, Dr. Misael Murphy/rheumatology     PMH/PSH:  Past Medical History:   Diagnosis Date    Achilles tendon pain     previous right achilles surgery    Allergies     Cervical pain (neck)     epidural    Chronic back pain     HTN (hypertension)     Hypertriglyceridemia     Near syncope     Osteoarthritis     Osteopenia     Type 2 diabetes mellitus without complication (H)      Past Surgical History:   Procedure Laterality Date    EXCISE CYST BAKER'S      LAPAROTOMY, TUBAL LIGATION, COMBINED      REMOVE IMPLANT BREAST         Family Hx:  Family History   Problem Relation Age of Onset    Diabetes Mother     Dementia Mother     Hypertension Father     Lung Cancer Father     Ovarian Cancer Sister     Breast Cancer No family hx of     Colon Cancer No family  hx of          Social Hx:  Social History     Socioeconomic History    Marital status:      Spouse name: Not on file    Number of children: Not on file    Years of education: Not on file    Highest education level: Not on file   Occupational History    Not on file   Tobacco Use    Smoking status: Never    Smokeless tobacco: Never   Vaping Use    Vaping status: Never Used   Substance and Sexual Activity    Alcohol use: Never    Drug use: Never    Sexual activity: Yes     Partners: Male   Other Topics Concern    Not on file   Social History Narrative    Not on file     Social Drivers of Health     Financial Resource Strain: Low Risk  (2/9/2024)    Financial Resource Strain     Within the past 12 months, have you or your family members you live with been unable to get utilities (heat, electricity) when it was really needed?: No   Food Insecurity: Low Risk  (2/9/2024)    Food Insecurity     Within the past 12 months, did you worry that your food would run out before you got money to buy more?: No     Within the past 12 months, did the food you bought just not last and you didn t have money to get more?: No   Transportation Needs: Low Risk  (2/9/2024)    Transportation Needs     Within the past 12 months, has lack of transportation kept you from medical appointments, getting your medicines, non-medical meetings or appointments, work, or from getting things that you need?: No   Physical Activity: Unknown (2/9/2024)    Exercise Vital Sign     Days of Exercise per Week: 6 days     Minutes of Exercise per Session: Not on file   Stress: No Stress Concern Present (2/9/2024)    [employer] - Occupational Stress Questionnaire     Feeling of Stress : Only a little   Social Connections: Unknown (2/9/2024)    Social Connection and Isolation Panel [NHANES]     Frequency of Communication with Friends and Family: Not on file     Frequency of Social Gatherings with Friends and Family: More than three  times a week     Attends Taoism Services: Not on file     Active Member of Clubs or Organizations: Not on file     Attends Club or Organization Meetings: Not on file     Marital Status: Not on file   Interpersonal Safety: Not on file   Housing Stability: Low Risk  (2/9/2024)    Housing Stability     Do you have housing? : Yes     Are you worried about losing your housing?: No          MEDICATIONS:  has a current medication list which includes the following prescription(s): gabapentin, ibuprofen, jentadueto, magnesium hydroxide, pyridoxine hcl, blood glucose, blood glucose monitoring, dexcom g7 , dexcom g7 sensor, glipizide, simethicone, and thin.    ROS:     ROS: 10 point ROS neg other than the symptoms noted above in the HPI.    GENERAL: mild fatigue, wt stable; denies fevers, chills, night sweats.   HEENT: no dysphagia, odonophagia, diplopia, neck pain  THYROID:  no apparent hyper or hypothyroid symptoms  CV: no chest pain, pressure, palpitations  LUNGS: no SOB, MILLER, cough, wheezing   ABDOMEN: no diarrhea, constipation, abdominal pain  EXTREMITIES: no rashes, ulcers, edema  NEUROLOGY: no headaches, denies changes in vision, tingling, extremitiy numbness   MSK: less right shoulder pain with ROM; denies muscle weakness  SKIN: scalp itching as noted; no rashes or lesions  :  no menses since age 53  PSYCH:  stable mood, no significant anxiety or depression  ENDOCRINE: no heat or cold intolerance    Physical Exam   VS: BP (!) 144/83   Pulse 97   Wt 51.3 kg (113 lb)   BMI 22.07 kg/m    GENERAL: AXOX3, NAD, short stature, well dressed, answering questions appropriately, appears stated age.  THYROID:  normal gland, no apparent nodules or goiter  HEENT: neck non-tender, no exopthalmous, no proptosis, EOMI  LUNGS: no audible wheeze, cough or visible cyanosis, no visible retractions or increased work of breathing  ABDOMEN: normal size, soft, nondistended  EXTREMITIES: feet not examined  NEUROLOGY: CN grossly  intact, no tremors  MSK: grossly intact  SKIN: darker skin complexion; no rashes, no lesions    LABS:    All pertinent notes, labs, and images personally reviewed by me.     A/P:  Encounter Diagnosis   Name Primary?    Type 2 diabetes mellitus with diabetic neuropathy, without long-term current use of insulin (H) Yes         Comments:  Reviewed health history and diabetes issues.  Scalp itching likely due to glipizideER and sulfa allergy  Reviewed and interpreted tests that I previously ordered.   Ordered appropriate tests for the endocrinology disease management.    Management options discussed and implemented after shared medical decision making with the patient.  Diabetes problem is chronic, stable    Plan:  Discussed general issues with the diabetes diagnosis and management  We discussed the hgbA1c test which reflects previous overall glucose levels or control  Discussed the importance of blood glucose (BG) testing to assess glucose trends  Provided general overview of the diabetes medication options and medication treatment plan    Recommend:  Continue current Jentadueto medication   She reports Jentadueto cost ~$140 for 3-mo supply  Patient to message me if she learns JanumetXR cheaper  Would not restart glipizideER medication at this time  Avoid the Jardiance med... she didn't tolerate it after PCP Rx  Consider future trial of pioglitazone medication  Goal target premeal glucose  mg/dl  Keep focus on diet, exercise, and weight management  Continue the gabapentin 100 mg as 1-2 tablets at bedtime, monitor for feet tingling  Plan fasting labs in late 5/2025   Testing at [facility]   Lab orders placed  Advise having fasting lipid panel testing and dilated eye examination, at least annually    Addressed patient questions today    The longitudinal plan of care for the endocrine problem(s) were addressed during this visit.  Due to added complexity of care,   we will continue to support the patient and  the subsequent management of this condition with ongoing continuity of care.    There are no Patient Instructions on file for this visit.    Future labs ordered today:   Orders Placed This Encounter   Procedures    Hemoglobin A1c    Basic metabolic panel    Lipid panel reflex to direct LDL Fasting     Radiology/Consults ordered today: None    Total time spent on day of encounter:  20 min    Follow-up:  6/5/25 at 8:30 am, Return    THERESA España MD, MS  Endocrinology  [facility]

## 2025-03-22 ENCOUNTER — HEALTH MAINTENANCE LETTER (OUTPATIENT)
Age: 65
End: 2025-03-22

## 2025-04-03 DIAGNOSIS — E11.40 TYPE 2 DIABETES MELLITUS WITH DIABETIC NEUROPATHY, WITHOUT LONG-TERM CURRENT USE OF INSULIN (H): ICD-10-CM

## 2025-04-03 RX ORDER — GABAPENTIN 100 MG/1
CAPSULE ORAL
Qty: 60 CAPSULE | Refills: 3 | Status: SHIPPED | OUTPATIENT
Start: 2025-04-03

## 2025-04-03 NOTE — TELEPHONE ENCOUNTER
Last Written Prescription Date:  12/6/24  Last Fill Quantity: 60,  # refills: 3   Last office visit: 2/17/2025 ; last virtual visit: Visit date not found with prescribing provider:  Dr. España   Future Office Visit: 6/5/25  Next 5 appointments (look out 90 days)      Apr 25, 2025 10:30 AM  (Arrive by 10:10 AM)  Provider Visit with Waldemar Yates PA-C  [facility] ([facility] ) [address] [place] [address], [address]  [facility] [address]  [phone]           Requested Prescriptions   Pending Prescriptions Disp Refills    gabapentin (NEURONTIN) 100 MG capsule [Pharmacy Med Name: GABAPENTIN 100 MG CAPSULE] 60 capsule 3     Sig: TAKE 1-2 CAPSULES BY MOUTH AT BEDTIME AS DIRECTED       There is no refill protocol information for this order        Refills sent  Yvonne Holliday RN

## 2025-04-12 ENCOUNTER — HEALTH MAINTENANCE LETTER (OUTPATIENT)
Age: 65
End: 2025-04-12

## 2025-04-28 ENCOUNTER — TELEPHONE (OUTPATIENT)
Dept: ENDOCRINOLOGY | Facility: CLINIC | Age: 65
End: 2025-04-28
Payer: COMMERCIAL

## 2025-04-28 ENCOUNTER — PATIENT OUTREACH (OUTPATIENT)
Dept: CARE COORDINATION | Facility: CLINIC | Age: 65
End: 2025-04-28
Payer: COMMERCIAL

## 2025-04-28 DIAGNOSIS — E11.40 TYPE 2 DIABETES MELLITUS WITH DIABETIC NEUROPATHY, WITHOUT LONG-TERM CURRENT USE OF INSULIN (H): Primary | ICD-10-CM

## 2025-04-28 NOTE — TELEPHONE ENCOUNTER
[facility] Call Center    Phone Message    May a detailed message be left on voicemail: yes     Reason for Call: Medication Question or concern regarding medication   Prescription Clarification  Name of Medication: linagliptin-metFORMIN (JENTADUETO) 2.5-1000 MG per tablet [[id number]]   Taco    Prescribing Provider: [facility]   Pharmacy: [facility]ACY #[id number] [address] [facility]     What on the order needs clarification? Patient requires a refill, however she would like to discuss medication prior to refill and have a medication that she would only need to take once daily.       Action Taken: Other: endo    Travel Screening: Not Applicable     Date of Service:

## 2025-04-30 ENCOUNTER — TELEPHONE (OUTPATIENT)
Dept: FAMILY MEDICINE | Facility: CLINIC | Age: 65
End: 2025-04-30
Payer: COMMERCIAL

## 2025-04-30 NOTE — TELEPHONE ENCOUNTER
Called pt to let her know that a message was sent to provider about changing to a once a day med instead of the twice daily Jentadueto.  She has enough Jentadueto until Friday, but doesn't want to refill it yet in case provider orders a new medication.   Yvonne Holliday RN

## 2025-04-30 NOTE — TELEPHONE ENCOUNTER
Pt is requesting a call back to further discuss this as she is completely out of medication at this time. Please advise.

## 2025-04-30 NOTE — TELEPHONE ENCOUNTER
Called with lab results.  Triglycerides elevated.  Not currently on statin therapy.    The 10-year ASCVD risk score (Elvis NICOLE, et al., 2019) is: 14.1%    Values used to calculate the score:      Age: 64 years      Sex: Female      Is Non- : No      Diabetic: Yes      Tobacco smoker: No      Systolic Blood Pressure: 135 mmHg      Is BP treated: No      HDL Cholesterol: 43 mg/dL      Total Cholesterol: 201 mg/dL  Rest of labs unremarkable.  Inflammatory markers negative.  Normal blood counts, kidney function, and liver function.    Recommended statin therapy however she declines.  Healthy diet and exercise reviewed.  Consider omega-3 fish oil.  Works with diabetes .  Upcoming endo follow-up.

## 2025-05-02 NOTE — TELEPHONE ENCOUNTER
Patient states she doesn't have anything for tonight, so what is she to do. Can she skip the weekend? Please call and advise.

## 2025-05-04 ENCOUNTER — MYC MEDICAL ADVICE (OUTPATIENT)
Dept: ENDOCRINOLOGY | Facility: CLINIC | Age: 65
End: 2025-05-04
Payer: COMMERCIAL

## 2025-05-04 RX ORDER — SITAGLIPTIN AND METFORMIN HYDROCHLORIDE 1000; 50 MG/1; MG/1
2 TABLET, FILM COATED, EXTENDED RELEASE ORAL DAILY
Qty: 60 TABLET | Refills: 5 | Status: SHIPPED | OUTPATIENT
Start: 2025-05-04

## 2025-05-04 NOTE — TELEPHONE ENCOUNTER
Message reviewed today.  I would like patient to change from the Jentadueto 1-tablet twice per day to the JanumetXR 2 tablets once per day. I have sent this new Rx to your [facility] pharmacy.  Contact me if questions.    THERESA España MD, MS  Endocrinology  [facility]

## 2025-05-24 ENCOUNTER — HEALTH MAINTENANCE LETTER (OUTPATIENT)
Age: 65
End: 2025-05-24

## 2025-05-29 ENCOUNTER — LAB (OUTPATIENT)
Dept: LAB | Facility: CLINIC | Age: 65
End: 2025-05-29
Payer: COMMERCIAL

## 2025-05-29 ENCOUNTER — RESULTS FOLLOW-UP (OUTPATIENT)
Dept: ENDOCRINOLOGY | Facility: CLINIC | Age: 65
End: 2025-05-29

## 2025-05-29 DIAGNOSIS — E11.40 TYPE 2 DIABETES MELLITUS WITH DIABETIC NEUROPATHY, WITHOUT LONG-TERM CURRENT USE OF INSULIN (H): ICD-10-CM

## 2025-05-29 LAB
ANION GAP SERPL CALCULATED.3IONS-SCNC: 11 MMOL/L (ref 7–15)
BUN SERPL-MCNC: 13 MG/DL (ref 8–23)
CALCIUM SERPL-MCNC: 9 MG/DL (ref 8.8–10.4)
CHLORIDE SERPL-SCNC: 105 MMOL/L (ref 98–107)
CHOLEST SERPL-MCNC: 163 MG/DL
CREAT SERPL-MCNC: 0.54 MG/DL (ref 0.51–0.95)
EGFRCR SERPLBLD CKD-EPI 2021: >90 ML/MIN/1.73M2
EST. AVERAGE GLUCOSE BLD GHB EST-MCNC: 146 MG/DL
FASTING STATUS PATIENT QL REPORTED: YES
FASTING STATUS PATIENT QL REPORTED: YES
GLUCOSE SERPL-MCNC: 181 MG/DL (ref 70–99)
HBA1C MFR BLD: 6.7 % (ref 0–5.6)
HCO3 SERPL-SCNC: 22 MMOL/L (ref 22–29)
HDLC SERPL-MCNC: 47 MG/DL
LDLC SERPL CALC-MCNC: 85 MG/DL
NONHDLC SERPL-MCNC: 116 MG/DL
POTASSIUM SERPL-SCNC: 4.4 MMOL/L (ref 3.4–5.3)
SODIUM SERPL-SCNC: 138 MMOL/L (ref 135–145)
TRIGL SERPL-MCNC: 157 MG/DL

## 2025-06-05 ENCOUNTER — OFFICE VISIT (OUTPATIENT)
Dept: ENDOCRINOLOGY | Facility: CLINIC | Age: 65
End: 2025-06-05
Payer: COMMERCIAL

## 2025-06-05 VITALS
WEIGHT: 112 LBS | SYSTOLIC BLOOD PRESSURE: 144 MMHG | BODY MASS INDEX: 21.87 KG/M2 | DIASTOLIC BLOOD PRESSURE: 79 MMHG | HEART RATE: 75 BPM

## 2025-06-05 DIAGNOSIS — E11.40 TYPE 2 DIABETES MELLITUS WITH DIABETIC NEUROPATHY, WITHOUT LONG-TERM CURRENT USE OF INSULIN (H): ICD-10-CM

## 2025-06-05 RX ORDER — SITAGLIPTIN AND METFORMIN HYDROCHLORIDE 1000; 50 MG/1; MG/1
2 TABLET, FILM COATED, EXTENDED RELEASE ORAL DAILY
Qty: 180 TABLET | Refills: 3 | Status: SHIPPED | OUTPATIENT
Start: 2025-06-05

## 2025-06-05 NOTE — PROGRESS NOTES
Recent issues:   Diabetes follow-up evaluation.  Concerns about worsening arthralgias, plans to see her rheumatologist Dr. Murphy soon  She had inquired about changing her diabetes meds back to a daily dose option.   I messaged her 5/4/25 with the new JanumetXR daily plan, but she didn't read the [employer] message/plan        2017. Diagnosis of diabetes mellitus  Medical evaluations with Ced[facility] TOM/Doc  Initial treatment with metformin mediation    9/1/21. Initial diabetes evaluation with me at [place]  Reviewed health history and diabetes issues  Taking metformin 500 mg BID  Advised change to JanumetXR 100/1000 every day, then CDE eval and diagnostic Fele[facility]  1/2023. Med change to Jardiance per PCP Dr. Parry, but significant symptoms after 1st dose and stopped it  Subsequent change from JanumetXR to Jentadueto medication  ~2024. Tried glimeparide and then glipizideER, but stopped due to concern for head itching and head bumps  Current DM medications:  Jentadueto 2.5/1000 1-tab in morning and evening    Blood glucose (BG) meter One Touch   Previously testing BID   Recent trends 155-199 mg/dl, avg 170 mg/dl  2023. Changed from DexcomG6 to G7, but then stopped due to cost  FamHx DM: Mother  Previous [facility] labs include:        Previous [facility] hgbA1c trends include:  Lab Results   Component Value Date    A1C 6.7 (H) 05/29/2025    A1C 7.1 (H) 02/11/2025    A1C 5.9 (H) 10/18/2024    A1C 5.9 (H) 07/06/2024    A1C 7.1 (H) 03/21/2024        Recent [facility] labs include:  Lab Results   Component Value Date    A1C 6.7 (H) 05/29/2025     05/29/2025    POTASSIUM 4.4 05/29/2025    CHLORIDE 105 05/29/2025    CO2 22 05/29/2025    ANIONGAP 11 05/29/2025     (H) 05/29/2025    BUN 13.0 05/29/2025    CR 0.54 05/29/2025    GFRESTIMATED >90 05/29/2025    GFRESTBLACK >90 12/27/2012    ARIEL 9.0 05/29/2025    CPEPT 1.8 06/15/2022    CHOL 163 05/29/2025    TRIG 157 (H) 05/29/2025    HDL 47 (L) 05/29/2025    LDL 85 05/29/2025     NHDL 116 05/29/2025    UCRR 82.0 10/18/2024    MICROL <12.0 10/18/2024    UMALCR  10/18/2024      Comment:      Unable to calculate, urine albumin and/or urine creatinine is outside detectable limits.  Microalbuminuria is defined as an albumin:creatinine ratio of 17 to 299 for males and 25 to 299 for females. A ratio of albumin:creatinine of 300 or higher is indicative of overt proteinuria.  Due to biologic variability, positive results should be confirmed by a second, first-morning random or 24-hour timed urine specimen. If there is discrepancy, a third specimen is recommended. When 2 out of 3 results are in the microalbuminuria range, this is evidence for incipient nephropathy and warrants increased efforts at glucose control, blood pressure control, and institution of therapy with an angiotensin-converting-enzyme (ACE) inhibitor (if the patient can tolerate it).      TSH 0.93 04/25/2025     Had eye exam with Dr[facility] 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 Mague Zavala/Allergist, Dr. Misael Murphy/rheumatology     PMH/PSH:  Past Medical History:   Diagnosis Date    Achilles tendon pain     previous right achilles surgery    Allergies     Cervical pain (neck)     epidural    Chronic back pain     HTN (hypertension)     Hypertriglyceridemia     Near syncope     Osteoarthritis     Osteopenia     Type 2 diabetes mellitus without complication (H)      Past Surgical History:   Procedure Laterality Date    EXCISE CYST BAKER'S      LAPAROTOMY, TUBAL LIGATION, COMBINED      REMOVE IMPLANT BREAST         Family Hx:  Family History   Problem Relation Age of Onset    Diabetes Mother     Dementia Mother     Hypertension Father     Lung Cancer Father     Ovarian Cancer Sister     Breast Cancer No family hx of     Colon  Cancer No family hx of          Social Hx:  Social History     Socioeconomic History    Marital status:      Spouse name: Not on file    Number of children: Not on file    Years of education: Not on file    Highest education level: Not on file   Occupational History    Not on file   Tobacco Use    Smoking status: Never    Smokeless tobacco: Never   Vaping Use    Vaping status: Never Used   Substance and Sexual Activity    Alcohol use: Never    Drug use: Never    Sexual activity: Yes     Partners: Male   Other Topics Concern    Not on file   Social History Narrative    Not on file     Social Drivers of Health     Financial Resource Strain: Low Risk  (4/25/2025)    Financial Resource Strain     Within the past 12 months, have you or your family members you live with been unable to get utilities (heat, electricity) when it was really needed?: No   Food Insecurity: Low Risk  (4/25/2025)    Food Insecurity     Within the past 12 months, did you worry that your food would run out before you got money to buy more?: No     Within the past 12 months, did the food you bought just not last and you didn t have money to get more?: No   Transportation Needs: Low Risk  (4/25/2025)    Transportation Needs     Within the past 12 months, has lack of transportation kept you from medical appointments, getting your medicines, non-medical meetings or appointments, work, or from getting things that you need?: No   Physical Activity: Unknown (4/25/2025)    Exercise Vital Sign     Days of Exercise per Week: 2 days     Minutes of Exercise per Session: Not on file   Stress: No Stress Concern Present (4/25/2025)    [employer] - Occupational Stress Questionnaire     Feeling of Stress : Only a little   Social Connections: Unknown (4/25/2025)    Social Connection and Isolation Panel [NHANES]     Frequency of Communication with Friends and Family: Not on file     Frequency of Social Gatherings with Friends and  Family: Once a week     Attends [employer] Services: Not on file     Active Member of Clubs or Organizations: Not on file     Attends Club or Organization Meetings: Not on file     Marital Status: Not on file   Interpersonal Safety: Low Risk  (4/25/2025)    Interpersonal Safety     Do you feel physically and emotionally safe where you currently live?: Yes     Within the past 12 months, have you been hit, slapped, kicked or otherwise physically hurt by someone?: No     Within the past 12 months, have you been humiliated or emotionally abused in other ways by your partner or ex-partner?: No   Housing Stability: Low Risk  (4/25/2025)    Housing Stability     Do you have housing? : Yes     Are you worried about losing your housing?: No          MEDICATIONS:  has a current medication list which includes the following prescription(s): ibuprofen, jentadueto, magnesium hydroxide, pyridoxine hcl, simethicone, janumet xr, blood glucose, blood glucose monitoring, dexcom g7 , dexcom g7 sensor, gabapentin, gabapentin, and thin.    ROS:     ROS: 10 point ROS neg other than the symptoms noted above in the HPI.    GENERAL: mild fatigue, wt stable; denies fevers, chills, night sweats.   HEENT: no dysphagia, odonophagia, diplopia, neck pain  THYROID:  no apparent hyper or hypothyroid symptoms  CV: no chest pain, pressure, palpitations  LUNGS: no SOB, MILLER, cough, wheezing   ABDOMEN: no diarrhea, constipation, abdominal pain  EXTREMITIES: no rashes, ulcers, edema  NEUROLOGY: no headaches, denies changes in vision, tingling, extremitiy numbness   MSK: less right shoulder pain with ROM; denies muscle weakness  SKIN: scalp itching as noted; no rashes or lesions  :  no menses since age 53  PSYCH:  stable mood, no significant anxiety or depression  ENDOCRINE: no heat or cold intolerance    Physical Exam   VS: BP (!) 144/79   Pulse 75   Wt 50.8 kg (112 lb)   BMI 21.87 kg/m    GENERAL: AXOX3, NAD, short stature, well dressed,  answering questions appropriately, appears stated age.  THYROID:  normal gland, no apparent nodules or goiter  HEENT: neck non-tender, no exopthalmous, no proptosis, EOMI  LUNGS: no audible wheeze, cough or visible cyanosis, no visible retractions or increased work of breathing  ABDOMEN: normal size, soft, nondistended  EXTREMITIES: scar at post right ankle; normal appearance of feet, pulses R/L DP 3/3, no pedal edema  NEUROLOGY: CN grossly intact, no tremors  MSK: grossly intact  SKIN: darker skin complexion; no rashes, no lesions    LABS:    All pertinent notes, labs, and images personally reviewed by me.     A/P:  Encounter Diagnosis   Name Primary?    Type 2 diabetes mellitus with diabetic neuropathy, without long-term current use of insulin (H)        Comments:  Reviewed health history and diabetes issues.  Previous scalp itching likely due to glipizideER and sulfa allergy  Reviewed and interpreted tests that I previously ordered.   Ordered appropriate tests for the endocrinology disease management.    Management options discussed and implemented after shared medical decision making with the patient.  Diabetes problem is chronic, stable    Plan:  Discussed general issues with the diabetes diagnosis and management  We discussed the hgbA1c test which reflects previous overall glucose levels or control  Discussed the importance of blood glucose (BG) testing to assess glucose trends  Provided general overview of the diabetes medication options and medication treatment plan    Recommend:  Change from Jentadueto BID to JanumetXR daily dose routine, as previously planned   Updated this JanumetXR Rx as 90-day supply, per her request  Would not restart glipizideER medication at this time  Avoid the Jardiance med... she didn't tolerate it after PCP Rx  Consider future trial of pioglitazone medication  Goal target premeal glucose  mg/dl  Keep focus on diet, exercise, and weight management  Continue the gabapentin  100 mg as 1-2 tablets at bedtime, monitor for feet tingling  Plan fasting labs in late 10/2025   Testing at [facility]   Lab orders placed  Advise having fasting lipid panel testing and dilated eye examination, at least annually    Keep planned rheumatology follow-up appt soon  Addressed patient questions today    The longitudinal plan of care for the endocrine problem(s) were addressed during this visit.  Due to added complexity of care,   we will continue to support the patient and the subsequent management of this condition with ongoing continuity of care.    There are no Patient Instructions on file for this visit.    Future labs ordered today:   Orders Placed This Encounter   Procedures    Hemoglobin A1c    Basic metabolic panel    Albumin Random Urine Quantitative with Creat Ratio     Radiology/Consults ordered today: None    Total time spent on day of encounter:  15 min    Follow-up:  10/2025 with EG, Return    2/2026 with me, Return    THERESA España MD, MS  Endocrinology  [facility]

## 2025-06-06 ENCOUNTER — TRANSFERRED RECORDS (OUTPATIENT)
Dept: HEALTH INFORMATION MANAGEMENT | Facility: CLINIC | Age: 65
End: 2025-06-06
Payer: COMMERCIAL